# Patient Record
Sex: MALE | Race: WHITE | NOT HISPANIC OR LATINO | Employment: OTHER | URBAN - METROPOLITAN AREA
[De-identification: names, ages, dates, MRNs, and addresses within clinical notes are randomized per-mention and may not be internally consistent; named-entity substitution may affect disease eponyms.]

---

## 2023-11-07 DIAGNOSIS — N40.0 BENIGN PROSTATIC HYPERPLASIA, UNSPECIFIED WHETHER LOWER URINARY TRACT SYMPTOMS PRESENT: Primary | ICD-10-CM

## 2023-11-07 RX ORDER — TAMSULOSIN HYDROCHLORIDE 0.4 MG/1
0.8 CAPSULE ORAL DAILY
Qty: 180 CAPSULE | Refills: 0 | Status: SHIPPED | OUTPATIENT
Start: 2023-11-07 | End: 2024-01-04 | Stop reason: SDUPTHER

## 2023-12-15 PROBLEM — R97.20 ELEVATED PSA: Status: ACTIVE | Noted: 2018-08-06

## 2023-12-15 PROBLEM — E03.9 HYPOTHYROIDISM: Status: ACTIVE | Noted: 2018-08-06

## 2023-12-15 PROBLEM — E78.5 HYPERLIPIDEMIA: Status: ACTIVE | Noted: 2019-08-12

## 2024-01-04 ENCOUNTER — TELEPHONE (OUTPATIENT)
Dept: PRIMARY CARE | Facility: CLINIC | Age: 79
End: 2024-01-04

## 2024-01-04 ENCOUNTER — OFFICE VISIT (OUTPATIENT)
Dept: PRIMARY CARE | Facility: CLINIC | Age: 79
End: 2024-01-04
Payer: MEDICARE

## 2024-01-04 VITALS
OXYGEN SATURATION: 98 % | HEART RATE: 79 BPM | BODY MASS INDEX: 25.75 KG/M2 | DIASTOLIC BLOOD PRESSURE: 82 MMHG | SYSTOLIC BLOOD PRESSURE: 134 MMHG | WEIGHT: 164.4 LBS

## 2024-01-04 DIAGNOSIS — E03.9 HYPOTHYROIDISM, UNSPECIFIED TYPE: ICD-10-CM

## 2024-01-04 DIAGNOSIS — M17.0 PRIMARY OSTEOARTHRITIS OF BOTH KNEES: Primary | ICD-10-CM

## 2024-01-04 DIAGNOSIS — E78.5 HYPERLIPIDEMIA, UNSPECIFIED HYPERLIPIDEMIA TYPE: ICD-10-CM

## 2024-01-04 DIAGNOSIS — N40.0 BENIGN PROSTATIC HYPERPLASIA, UNSPECIFIED WHETHER LOWER URINARY TRACT SYMPTOMS PRESENT: ICD-10-CM

## 2024-01-04 PROCEDURE — 99214 OFFICE O/P EST MOD 30 MIN: CPT | Performed by: STUDENT IN AN ORGANIZED HEALTH CARE EDUCATION/TRAINING PROGRAM

## 2024-01-04 PROCEDURE — 1160F RVW MEDS BY RX/DR IN RCRD: CPT | Performed by: STUDENT IN AN ORGANIZED HEALTH CARE EDUCATION/TRAINING PROGRAM

## 2024-01-04 PROCEDURE — 1036F TOBACCO NON-USER: CPT | Performed by: STUDENT IN AN ORGANIZED HEALTH CARE EDUCATION/TRAINING PROGRAM

## 2024-01-04 PROCEDURE — 1159F MED LIST DOCD IN RCRD: CPT | Performed by: STUDENT IN AN ORGANIZED HEALTH CARE EDUCATION/TRAINING PROGRAM

## 2024-01-04 PROCEDURE — 1126F AMNT PAIN NOTED NONE PRSNT: CPT | Performed by: STUDENT IN AN ORGANIZED HEALTH CARE EDUCATION/TRAINING PROGRAM

## 2024-01-04 RX ORDER — TAMSULOSIN HYDROCHLORIDE 0.4 MG/1
0.8 CAPSULE ORAL DAILY
Qty: 180 CAPSULE | Refills: 1 | Status: SHIPPED | OUTPATIENT
Start: 2024-01-04

## 2024-01-04 RX ORDER — TAMSULOSIN HYDROCHLORIDE 0.4 MG/1
0.8 CAPSULE ORAL DAILY
Qty: 180 CAPSULE | Refills: 0 | Status: CANCELLED | OUTPATIENT
Start: 2024-01-04

## 2024-01-04 RX ORDER — ATORVASTATIN CALCIUM 20 MG/1
20 TABLET, FILM COATED ORAL DAILY
Qty: 90 TABLET | Refills: 1 | Status: CANCELLED | OUTPATIENT
Start: 2024-01-04

## 2024-01-04 RX ORDER — LEVOTHYROXINE SODIUM 50 UG/1
50 TABLET ORAL DAILY
Qty: 90 TABLET | Refills: 1 | Status: CANCELLED | OUTPATIENT
Start: 2024-01-04

## 2024-01-04 ASSESSMENT — PATIENT HEALTH QUESTIONNAIRE - PHQ9
SUM OF ALL RESPONSES TO PHQ9 QUESTIONS 1 AND 2: 0
1. LITTLE INTEREST OR PLEASURE IN DOING THINGS: NOT AT ALL
2. FEELING DOWN, DEPRESSED OR HOPELESS: NOT AT ALL

## 2024-01-04 ASSESSMENT — PAIN SCALES - GENERAL: PAINLEVEL: 0-NO PAIN

## 2024-01-04 NOTE — PROGRESS NOTES
GENERAL PROGRESS NOTE    Chief Complaint   Patient presents with    med check      Discuss covid vaccine        HPI  Víctor Madison is a 78 y.o. male that presents today to discuss COVID vaccine. He is planning to have done but wants to make sure still recommended.     #Knee OA  Has seen Dr. Brennan for knee OA, has had 2 steroid injections. Now following with Dr. Trotter. Had gel injection that was not too effective.   Steroid - Lt 6/20, 9/20. Rt 6/28, 9/20  Gel - 11/14 both knees  Plans to follow back soon to discuss replacement given the gel injections did not work well.     Due for CPE 4/2023    Vital signs   /82   Pulse 79   Wt 74.6 kg (164 lb 6.4 oz)   SpO2 98%   BMI 25.75 kg/m²      Current Outpatient Medications   Medication Sig Dispense Refill    atorvastatin (Lipitor) 20 mg tablet TAKE 1 TABLET DAILY 90 tablet 1    levothyroxine (Synthroid, Levoxyl) 50 mcg tablet TAKE 1 TABLET DAILY 90 tablet 1    tamsulosin (Flomax) 0.4 mg 24 hr capsule TAKE 2 CAPSULES DAILY 180 capsule 0    atorvastatin (Lipitor) 20 mg tablet Take 1 tablet (20 mg) by mouth once daily.      levothyroxine (Synthroid, Levoxyl) 50 mcg tablet Take 1 tablet (50 mcg) by mouth once daily in the morning. Take before meals.       No current facility-administered medications for this visit.         All other systems have been reviewed and are negative except as noted in the HPI.     Nursing notes and vitals reviewed  General appearance - AAOx3, non distressed  CVS - Warm and well perfused  RESP - No respiratory distress  PSYCH - Normal thought content, fluent and appropriate speech        Assessment/Plan   1. Primary osteoarthritis of both knees  2. Benign prostatic hyperplasia, unspecified whether lower urinary tract symptoms present  - tamsulosin (Flomax) 0.4 mg 24 hr capsule; Take 2 capsules (0.8 mg) by mouth once daily.   Dispense: 180 capsule; Refill: 1    COVID vaccines risks/benefits discussion. Recommended he receive. He will proceed with vaccine at pharmacy. All questions answered.   Will follow back with ortho to discuss knee replacement.     Monae Moura MD  01/04/24  8:08 AM

## 2024-04-01 ENCOUNTER — HOSPITAL ENCOUNTER (OUTPATIENT)
Dept: RADIOLOGY | Facility: HOSPITAL | Age: 79
Discharge: HOME | End: 2024-04-01
Payer: MEDICARE

## 2024-04-01 ENCOUNTER — APPOINTMENT (OUTPATIENT)
Dept: RADIOLOGY | Facility: CLINIC | Age: 79
End: 2024-04-01
Payer: MEDICARE

## 2024-04-01 ENCOUNTER — HOSPITAL ENCOUNTER (OUTPATIENT)
Dept: RADIOLOGY | Facility: CLINIC | Age: 79
End: 2024-04-01
Payer: MEDICARE

## 2024-04-01 DIAGNOSIS — M25.561 PAIN IN RIGHT KNEE: ICD-10-CM

## 2024-04-01 PROCEDURE — 73700 CT LOWER EXTREMITY W/O DYE: CPT | Mod: RT

## 2024-04-18 ENCOUNTER — PRE-ADMISSION TESTING (OUTPATIENT)
Dept: PREADMISSION TESTING | Facility: HOSPITAL | Age: 79
End: 2024-04-18
Payer: MEDICARE

## 2024-04-18 VITALS
DIASTOLIC BLOOD PRESSURE: 68 MMHG | OXYGEN SATURATION: 99 % | RESPIRATION RATE: 18 BRPM | BODY MASS INDEX: 25.95 KG/M2 | HEIGHT: 67 IN | TEMPERATURE: 97.2 F | WEIGHT: 165.34 LBS | SYSTOLIC BLOOD PRESSURE: 138 MMHG | HEART RATE: 54 BPM

## 2024-04-18 DIAGNOSIS — Z01.818 PREOP EXAMINATION: Primary | ICD-10-CM

## 2024-04-18 LAB
ANION GAP SERPL CALC-SCNC: 9 MMOL/L (ref 10–20)
APPEARANCE UR: CLEAR
BASOPHILS # BLD AUTO: 0.05 X10*3/UL (ref 0–0.1)
BASOPHILS NFR BLD AUTO: 0.9 %
BILIRUB UR STRIP.AUTO-MCNC: NEGATIVE MG/DL
BUN SERPL-MCNC: 17 MG/DL (ref 6–23)
CALCIUM SERPL-MCNC: 9.1 MG/DL (ref 8.6–10.3)
CHLORIDE SERPL-SCNC: 104 MMOL/L (ref 98–107)
CO2 SERPL-SCNC: 33 MMOL/L (ref 21–32)
COLOR UR: YELLOW
CREAT SERPL-MCNC: 1.35 MG/DL (ref 0.5–1.3)
EGFRCR SERPLBLD CKD-EPI 2021: 54 ML/MIN/1.73M*2
EOSINOPHIL # BLD AUTO: 0.12 X10*3/UL (ref 0–0.4)
EOSINOPHIL NFR BLD AUTO: 2.2 %
ERYTHROCYTE [DISTWIDTH] IN BLOOD BY AUTOMATED COUNT: 15.1 % (ref 11.5–14.5)
GLUCOSE SERPL-MCNC: 78 MG/DL (ref 74–99)
GLUCOSE UR STRIP.AUTO-MCNC: NEGATIVE MG/DL
HCT VFR BLD AUTO: 44.5 % (ref 41–52)
HGB BLD-MCNC: 13.8 G/DL (ref 13.5–17.5)
IMM GRANULOCYTES # BLD AUTO: 0.02 X10*3/UL (ref 0–0.5)
IMM GRANULOCYTES NFR BLD AUTO: 0.4 % (ref 0–0.9)
KETONES UR STRIP.AUTO-MCNC: NEGATIVE MG/DL
LEUKOCYTE ESTERASE UR QL STRIP.AUTO: NEGATIVE
LYMPHOCYTES # BLD AUTO: 1.33 X10*3/UL (ref 0.8–3)
LYMPHOCYTES NFR BLD AUTO: 24.9 %
MCH RBC QN AUTO: 27.7 PG (ref 26–34)
MCHC RBC AUTO-ENTMCNC: 31 G/DL (ref 32–36)
MCV RBC AUTO: 89 FL (ref 80–100)
MONOCYTES # BLD AUTO: 0.52 X10*3/UL (ref 0.05–0.8)
MONOCYTES NFR BLD AUTO: 9.7 %
NEUTROPHILS # BLD AUTO: 3.31 X10*3/UL (ref 1.6–5.5)
NEUTROPHILS NFR BLD AUTO: 61.9 %
NITRITE UR QL STRIP.AUTO: NEGATIVE
NRBC BLD-RTO: 0 /100 WBCS (ref 0–0)
PH UR STRIP.AUTO: 5 [PH]
PLATELET # BLD AUTO: 226 X10*3/UL (ref 150–450)
POTASSIUM SERPL-SCNC: 4.6 MMOL/L (ref 3.5–5.3)
PROT UR STRIP.AUTO-MCNC: NEGATIVE MG/DL
RBC # BLD AUTO: 4.98 X10*6/UL (ref 4.5–5.9)
RBC # UR STRIP.AUTO: NEGATIVE /UL
SODIUM SERPL-SCNC: 141 MMOL/L (ref 136–145)
SP GR UR STRIP.AUTO: 1.02
UROBILINOGEN UR STRIP.AUTO-MCNC: <2 MG/DL
WBC # BLD AUTO: 5.4 X10*3/UL (ref 4.4–11.3)

## 2024-04-18 PROCEDURE — 81003 URINALYSIS AUTO W/O SCOPE: CPT

## 2024-04-18 PROCEDURE — 36415 COLL VENOUS BLD VENIPUNCTURE: CPT

## 2024-04-18 PROCEDURE — 93010 ELECTROCARDIOGRAM REPORT: CPT | Performed by: INTERNAL MEDICINE

## 2024-04-18 PROCEDURE — 93005 ELECTROCARDIOGRAM TRACING: CPT

## 2024-04-18 PROCEDURE — 80048 BASIC METABOLIC PNL TOTAL CA: CPT

## 2024-04-18 PROCEDURE — 99204 OFFICE O/P NEW MOD 45 MIN: CPT

## 2024-04-18 PROCEDURE — 85025 COMPLETE CBC W/AUTO DIFF WBC: CPT

## 2024-04-18 PROCEDURE — 87081 CULTURE SCREEN ONLY: CPT | Mod: GEALAB

## 2024-04-18 RX ORDER — CHLORHEXIDINE GLUCONATE ORAL RINSE 1.2 MG/ML
15 SOLUTION DENTAL DAILY
Qty: 473 ML | Refills: 0 | Status: SHIPPED | OUTPATIENT
Start: 2024-04-18 | End: 2024-05-06 | Stop reason: HOSPADM

## 2024-04-18 ASSESSMENT — ENCOUNTER SYMPTOMS
CHILLS: 0
DIARRHEA: 0
VOMITING: 0
SHORTNESS OF BREATH: 0
FEVER: 0
NUMBNESS: 0
NAUSEA: 0
ABDOMINAL PAIN: 0
WOUND: 0
DIFFICULTY URINATING: 0

## 2024-04-18 ASSESSMENT — PAIN SCALES - GENERAL: PAINLEVEL_OUTOF10: 0 - NO PAIN

## 2024-04-18 ASSESSMENT — DUKE ACTIVITY SCORE INDEX (DASI)
CAN YOU DO LIGHT WORK AROUND THE HOUSE LIKE DUSTING OR WASHING DISHES: YES
CAN YOU PARTICIPATE IN STRENOUS SPORTS LIKE SWIMMING, SINGLES TENNIS, FOOTBALL, BASKETBALL, OR SKIING: YES
CAN YOU DO HEAVY WORK AROUND THE HOUSE LIKE SCRUBBING FLOORS OR LIFTING AND MOVING HEAVY FURNITURE: YES
CAN YOU WALK INDOORS, SUCH AS AROUND YOUR HOUSE: YES
TOTAL_SCORE: 58.2
CAN YOU DO MODERATE WORK AROUND THE HOUSE LIKE VACUUMING, SWEEPING FLOORS OR CARRYING GROCERIES: YES
CAN YOU RUN A SHORT DISTANCE: YES
CAN YOU CLIMB A FLIGHT OF STAIRS OR WALK UP A HILL: YES
CAN YOU CLIMB A FLIGHT OF STAIRS OR WALK UP A HILL: YES
CAN YOU WALK A BLOCK OR TWO ON LEVEL GROUND: YES
CAN YOU RUN A SHORT DISTANCE: YES
CAN YOU PARTICIPATE IN MODERATE RECREATIONAL ACTIVITIES LIKE GOLF, BOWLING, DANCING, DOUBLES TENNIS OR THROWING A BASEBALL OR FOOTBALL: YES
CAN YOU TAKE CARE OF YOURSELF (EAT, DRESS, BATHE, OR USE TOILET): YES
CAN YOU DO MODERATE WORK AROUND THE HOUSE LIKE VACUUMING, SWEEPING FLOORS OR CARRYING GROCERIES: YES
CAN YOU DO YARD WORK LIKE RAKING LEAVES, WEEDING OR PUSHING A MOWER: YES
CAN YOU DO YARD WORK LIKE RAKING LEAVES, WEEDING OR PUSHING A MOWER: YES
CAN YOU WALK A BLOCK OR TWO ON LEVEL GROUND: YES
CAN YOU DO LIGHT WORK AROUND THE HOUSE LIKE DUSTING OR WASHING DISHES: YES
CAN YOU HAVE SEXUAL RELATIONS: YES
CAN YOU DO HEAVY WORK AROUND THE HOUSE LIKE SCRUBBING FLOORS OR LIFTING AND MOVING HEAVY FURNITURE: YES
CAN YOU PARTICIPATE IN STRENOUS SPORTS LIKE SWIMMING, SINGLES TENNIS, FOOTBALL, BASKETBALL, OR SKIING: NO
DASI METS SCORE: 9.9
CAN YOU WALK INDOORS, SUCH AS AROUND YOUR HOUSE: YES
CAN YOU PARTICIPATE IN MODERATE RECREATIONAL ACTIVITIES LIKE GOLF, BOWLING, DANCING, DOUBLES TENNIS OR THROWING A BASEBALL OR FOOTBALL: YES
CAN YOU TAKE CARE OF YOURSELF (EAT, DRESS, BATHE, OR USE TOILET): YES

## 2024-04-18 ASSESSMENT — PAIN - FUNCTIONAL ASSESSMENT: PAIN_FUNCTIONAL_ASSESSMENT: 0-10

## 2024-04-18 ASSESSMENT — CHADS2 SCORE
CHF: NO
AGE GREATER THAN OR EQUAL TO 75: YES
DIABETES: NO
HYPERTENSION: NO
CHADS2 SCORE: 1
PRIOR STROKE OR TIA OR THROMBOEMBOLISM: NO

## 2024-04-18 ASSESSMENT — LIFESTYLE VARIABLES: SMOKING_STATUS: NONSMOKER

## 2024-04-18 ASSESSMENT — ACTIVITIES OF DAILY LIVING (ADL): ADL_SCORE: 0

## 2024-04-18 NOTE — H&P (VIEW-ONLY)
CPM/PAT Evaluation       Name: Víctor Madison (Víctor Madison)  /Age: 1945/78 y.o.     Visit Type:   In-Person       Chief Complaint: Preoperative exam    Preoperative exam for right TKA on 24 with Dr. Trotter        Past Medical History:   Diagnosis Date    BPH (benign prostatic hyperplasia)     Elevated PSA     Hyperlipidemia     Hypothyroidism        Past Surgical History:   Procedure Laterality Date    UMBILICAL HERNIA REPAIR         Patient  has no history on file for sexual activity.    Family History   Problem Relation Name Age of Onset    Cervical cancer Mother      Lung cancer Mother      Cerebral aneurysm Maternal Grandfather  60       No Known Allergies    Prior to Admission medications    Medication Sig Start Date End Date Taking? Authorizing Provider   atorvastatin (Lipitor) 20 mg tablet TAKE 1 TABLET DAILY 23  Yes Monae Moura MD   levothyroxine (Synthroid, Levoxyl) 50 mcg tablet TAKE 1 TABLET DAILY 23  Yes Monae Moura MD   tamsulosin (Flomax) 0.4 mg 24 hr capsule Take 2 capsules (0.8 mg) by mouth once daily.  Patient taking differently: Take 1 capsule (0.4 mg) by mouth 2 times a day. 24  Yes Monae Moura MD   chlorhexidine (Peridex) 0.12 % solution Use 15 mL in the mouth or throat once daily. Swish and spit one capful the night before surgery and morning  of surgery 24  YADIRA Hare-CNP        PAT ROS:   Constitutional:    no fever   no chills  Neuro/Psych:    no numbness  Eyes:    use of corrective lenses  Ears:   Nose:   Mouth:    no dental issues  Throat:   Neck:   Cardio:    no chest pain  Respiratory:    no shortness of breath  Endocrine:   GI:    no abdominal pain   no diarrhea   no nausea   no vomiting  :    no difficulty urinating  Musculoskeletal:   Hematologic:    no history of blood transfusion   no blood clots  Skin:   no sores/wound   no rash      Physical Exam  Vitals reviewed.   Constitutional:       Appearance: Normal appearance.    HENT:      Mouth/Throat:      Mouth: Mucous membranes are moist.   Eyes:      Extraocular Movements: Extraocular movements intact.   Cardiovascular:      Rate and Rhythm: Normal rate and regular rhythm.      Heart sounds: Normal heart sounds.   Pulmonary:      Effort: Pulmonary effort is normal.      Breath sounds: Normal breath sounds.   Abdominal:      Palpations: Abdomen is soft.   Musculoskeletal:      Cervical back: Normal range of motion.      Right lower leg: No edema.      Left lower leg: No edema.   Skin:     General: Skin is warm and dry.   Neurological:      General: No focal deficit present.      Mental Status: He is alert and oriented to person, place, and time.   Psychiatric:         Behavior: Behavior normal.         Thought Content: Thought content normal.          PAT AIRWAY:   Airway:     Mallampati::  II    Neck ROM::  Full  normal        Visit Vitals  /68   Pulse 54   Temp 36.2 °C (97.2 °F) (Temporal)   Resp 18       DASI Risk Score      Flowsheet Row Most Recent Value   DASI SCORE 58.2   METS Score (Will be calculated only when all the questions are answered) 9.9          Caprini DVT Assessment      Flowsheet Row Most Recent Value   DVT Score 8   Current Status Major surgery planned, lasting 2-3 hours   History Prior major surgery   Age Over 75 years   BMI 30 or less          Modified Frailty Index      Flowsheet Row Most Recent Value   Modified Frailty Index Calculator 0          CHADS2 Stroke Risk         N/A 3 to 100%: High Risk   2 to < 3%: Medium Risk   0 to < 2%: Low Risk     Last Change: N/A          This score determines the patient's risk of having a stroke if the patient has atrial fibrillation.        This score is not applicable to this patient. Components are not calculated.          Revised Cardiac Risk Index      Flowsheet Row Most Recent Value   Revised Cardiac Risk Calculator 0          Apfel Simplified Score      Flowsheet Row Most Recent Value   Apfel Simplified Score  Calculator 2          Risk Analysis Index Results This Encounter         4/18/2024  1228             ROMANO Cancer History: Patient does not indicate history of cancer    Total Risk Analysis Index Score Without Cancer: 27    Total Risk Analysis Index Score: 27          Stop Bang Score      Flowsheet Row Most Recent Value   Do you snore loudly? 0   Do you often feel tired or fatigued after your sleep? 0   Has anyone ever observed you stop breathing in your sleep? 0   Do you have or are you being treated for high blood pressure? 0   Recent BMI (Calculated) 26   Is BMI greater than 35 kg/m2? 0=No   Age older than 50 years old? 1=Yes   Is your neck circumference greater than 17 inches (Male) or 16 inches (Female)? 0   Gender - Male 1=Yes   STOP-BANG Total Score 2            Assessment and Plan:     Neuro:  normal neuro    HEENT/Airway:  normal HEENT, normal airway    Cardiovascular: history of HLD, continue medication. EKG 4/18/24- SB, rate 50 bpm. No additional preoperative testing is currently indicated.    Pulmonary:  normal. Preoperative deep breathing educational handout provided to patient.    Renal: negative     Endocrine:  hypothyroidism, continue medication.     Hematologic:   negative.     Gastrointestinal:   negative    Infectious disease:  negative      Musculoskeletal:  history of OA, continue acetaminophen PRN      Labs ordered by this provider: CBC, BMP, MRSA, urinalysis, EKG

## 2024-04-19 LAB — HOLD SPECIMEN: NORMAL

## 2024-04-20 LAB — STAPHYLOCOCCUS SPEC CULT: NORMAL

## 2024-04-25 ENCOUNTER — EDUCATION (OUTPATIENT)
Dept: ORTHOPEDIC SURGERY | Facility: CLINIC | Age: 79
End: 2024-04-25
Payer: MEDICARE

## 2024-04-25 NOTE — GROUP NOTE
In addition to the group class activities, Víctor TING Madison had the following lab work completed:  No orders of the defined types were placed in this encounter.      A new History and Physical was not completed.    This class lasted approximately 1 hour and had 9 participants. The nurse instructor covered the following topics:  Overview of joint replacement surgery.  Instructions for at-home preparation for surgery (included below).  Expectations before and after surgery including hospital stay.  Discharge planning and home care options.  Physical therapy overview and review of at-home exercises.    Information for Surgery or Hospital Stay  For morning surgery, do not eat or drink after midnight. This includes water, mints, and chewing gum.  For afternoon surgery, you may have a clear liquid breakfast before 6 A.M. Clear liquids are anything you can see through, like apple juice, cranberry juice, tea or black coffee without cream. Do not eat or drink after 6 A.M. This includes water.  Wear loose fitting clothes--something that can be slipped on and off easily over a dressing.  Bring a walker or crutches with you to the hospital on the day of surgery.  Please inform the office if you have any symptoms of illness or fever prior to surgery.  You need to have transportation home when discharged, as you will be medicated.  STOP any aspirin or anti-inflammatory products (Aleve, Advil, etc.) 5-7 days before surgery.  You may use Tylenol or Extra Strength Tylenol.  STOP any vitamins or herbal products 10 days before surgery.

## 2024-05-01 ENCOUNTER — ANESTHESIA EVENT (OUTPATIENT)
Dept: OPERATING ROOM | Facility: HOSPITAL | Age: 79
End: 2024-05-01
Payer: MEDICARE

## 2024-05-03 ENCOUNTER — TELEPHONE (OUTPATIENT)
Dept: INPATIENT UNIT | Facility: HOSPITAL | Age: 79
End: 2024-05-03
Payer: MEDICARE

## 2024-05-06 ENCOUNTER — DOCUMENTATION (OUTPATIENT)
Dept: HOME HEALTH SERVICES | Facility: HOME HEALTH | Age: 79
End: 2024-05-06
Payer: MEDICARE

## 2024-05-06 ENCOUNTER — APPOINTMENT (OUTPATIENT)
Dept: RADIOLOGY | Facility: HOSPITAL | Age: 79
End: 2024-05-06
Payer: MEDICARE

## 2024-05-06 ENCOUNTER — HOSPITAL ENCOUNTER (OUTPATIENT)
Facility: HOSPITAL | Age: 79
Setting detail: OUTPATIENT SURGERY
Discharge: HOME HEALTH | End: 2024-05-06
Attending: ORTHOPAEDIC SURGERY | Admitting: ORTHOPAEDIC SURGERY
Payer: MEDICARE

## 2024-05-06 ENCOUNTER — ANESTHESIA (OUTPATIENT)
Dept: OPERATING ROOM | Facility: HOSPITAL | Age: 79
End: 2024-05-06
Payer: MEDICARE

## 2024-05-06 ENCOUNTER — HOME HEALTH ADMISSION (OUTPATIENT)
Dept: HOME HEALTH SERVICES | Facility: HOME HEALTH | Age: 79
End: 2024-05-06
Payer: MEDICARE

## 2024-05-06 VITALS
WEIGHT: 158.73 LBS | RESPIRATION RATE: 16 BRPM | BODY MASS INDEX: 24.91 KG/M2 | DIASTOLIC BLOOD PRESSURE: 70 MMHG | HEIGHT: 67 IN | HEART RATE: 54 BPM | OXYGEN SATURATION: 99 % | TEMPERATURE: 97.9 F | SYSTOLIC BLOOD PRESSURE: 129 MMHG

## 2024-05-06 DIAGNOSIS — Z96.651 STATUS POST TOTAL KNEE REPLACEMENT, RIGHT: Primary | ICD-10-CM

## 2024-05-06 PROCEDURE — 2500000004 HC RX 250 GENERAL PHARMACY W/ HCPCS (ALT 636 FOR OP/ED): Performed by: ANESTHESIOLOGY

## 2024-05-06 PROCEDURE — C1776 JOINT DEVICE (IMPLANTABLE): HCPCS | Performed by: ORTHOPAEDIC SURGERY

## 2024-05-06 PROCEDURE — 97116 GAIT TRAINING THERAPY: CPT | Mod: GP | Performed by: PHYSICAL THERAPIST

## 2024-05-06 PROCEDURE — 2500000004 HC RX 250 GENERAL PHARMACY W/ HCPCS (ALT 636 FOR OP/ED): Performed by: ORTHOPAEDIC SURGERY

## 2024-05-06 PROCEDURE — A6213 FOAM DRG >16<=48 SQ IN W/BDR: HCPCS | Performed by: ORTHOPAEDIC SURGERY

## 2024-05-06 PROCEDURE — 3600000018 HC OR TIME - INITIAL BASE CHARGE - PROCEDURE LEVEL SIX: Performed by: ORTHOPAEDIC SURGERY

## 2024-05-06 PROCEDURE — 2780000003 HC OR 278 NO HCPCS: Performed by: ORTHOPAEDIC SURGERY

## 2024-05-06 PROCEDURE — 99100 ANES PT EXTEME AGE<1 YR&>70: CPT | Performed by: ANESTHESIOLOGY

## 2024-05-06 PROCEDURE — 3600000017 HC OR TIME - EACH INCREMENTAL 1 MINUTE - PROCEDURE LEVEL SIX: Performed by: ORTHOPAEDIC SURGERY

## 2024-05-06 PROCEDURE — 7100000010 HC PHASE TWO TIME - EACH INCREMENTAL 1 MINUTE: Performed by: ORTHOPAEDIC SURGERY

## 2024-05-06 PROCEDURE — A9999 DME SUPPLY OR ACCESSORY, NOS: HCPCS | Performed by: ORTHOPAEDIC SURGERY

## 2024-05-06 PROCEDURE — 3700000001 HC GENERAL ANESTHESIA TIME - INITIAL BASE CHARGE: Performed by: ORTHOPAEDIC SURGERY

## 2024-05-06 PROCEDURE — 3700000002 HC GENERAL ANESTHESIA TIME - EACH INCREMENTAL 1 MINUTE: Performed by: ORTHOPAEDIC SURGERY

## 2024-05-06 PROCEDURE — 97161 PT EVAL LOW COMPLEX 20 MIN: CPT | Mod: GP | Performed by: PHYSICAL THERAPIST

## 2024-05-06 PROCEDURE — 73560 X-RAY EXAM OF KNEE 1 OR 2: CPT | Mod: RIGHT SIDE | Performed by: RADIOLOGY

## 2024-05-06 PROCEDURE — 2500000004 HC RX 250 GENERAL PHARMACY W/ HCPCS (ALT 636 FOR OP/ED): Performed by: NURSE ANESTHETIST, CERTIFIED REGISTERED

## 2024-05-06 PROCEDURE — 7100000002 HC RECOVERY ROOM TIME - EACH INCREMENTAL 1 MINUTE: Performed by: ORTHOPAEDIC SURGERY

## 2024-05-06 PROCEDURE — 7100000001 HC RECOVERY ROOM TIME - INITIAL BASE CHARGE: Performed by: ORTHOPAEDIC SURGERY

## 2024-05-06 PROCEDURE — A27447 PR TOTAL KNEE ARTHROPLASTY: Performed by: ANESTHESIOLOGY

## 2024-05-06 PROCEDURE — A27447 PR TOTAL KNEE ARTHROPLASTY: Performed by: NURSE ANESTHETIST, CERTIFIED REGISTERED

## 2024-05-06 PROCEDURE — 2500000004 HC RX 250 GENERAL PHARMACY W/ HCPCS (ALT 636 FOR OP/ED): Mod: JZ

## 2024-05-06 PROCEDURE — C1713 ANCHOR/SCREW BN/BN,TIS/BN: HCPCS | Performed by: ORTHOPAEDIC SURGERY

## 2024-05-06 PROCEDURE — 2720000007 HC OR 272 NO HCPCS: Performed by: ORTHOPAEDIC SURGERY

## 2024-05-06 PROCEDURE — 73560 X-RAY EXAM OF KNEE 1 OR 2: CPT | Mod: RT

## 2024-05-06 PROCEDURE — 2500000005 HC RX 250 GENERAL PHARMACY W/O HCPCS: Performed by: NURSE ANESTHETIST, CERTIFIED REGISTERED

## 2024-05-06 PROCEDURE — 97110 THERAPEUTIC EXERCISES: CPT | Mod: GP | Performed by: PHYSICAL THERAPIST

## 2024-05-06 PROCEDURE — 7100000009 HC PHASE TWO TIME - INITIAL BASE CHARGE: Performed by: ORTHOPAEDIC SURGERY

## 2024-05-06 DEVICE — CRUCIATE RETAINING FEMORAL
Type: IMPLANTABLE DEVICE | Site: KNEE | Status: FUNCTIONAL
Brand: TRIATHLON

## 2024-05-06 DEVICE — PATELLA
Type: IMPLANTABLE DEVICE | Site: PATELLA | Status: FUNCTIONAL
Brand: TRIATHLON

## 2024-05-06 DEVICE — TIBIAL COMPONENT
Type: IMPLANTABLE DEVICE | Site: KNEE | Status: FUNCTIONAL
Brand: TRIATHLON

## 2024-05-06 DEVICE — TIBIAL BEARING INSERT - CS
Type: IMPLANTABLE DEVICE | Site: KNEE | Status: FUNCTIONAL
Brand: TRIATHLON

## 2024-05-06 RX ORDER — ATORVASTATIN CALCIUM 20 MG/1
20 TABLET, FILM COATED ORAL DAILY
Status: CANCELLED | OUTPATIENT
Start: 2024-05-06

## 2024-05-06 RX ORDER — OXYCODONE AND ACETAMINOPHEN 5; 325 MG/1; MG/1
1 TABLET ORAL EVERY 6 HOURS PRN
COMMUNITY
Start: 2024-05-06

## 2024-05-06 RX ORDER — FENTANYL CITRATE 50 UG/ML
INJECTION, SOLUTION INTRAMUSCULAR; INTRAVENOUS AS NEEDED
Status: DISCONTINUED | OUTPATIENT
Start: 2024-05-06 | End: 2024-05-06

## 2024-05-06 RX ORDER — TRAMADOL HYDROCHLORIDE 50 MG/1
50 TABLET ORAL EVERY 8 HOURS PRN
Status: CANCELLED | OUTPATIENT
Start: 2024-05-06

## 2024-05-06 RX ORDER — ASPIRIN 325 MG
325 TABLET, DELAYED RELEASE (ENTERIC COATED) ORAL 2 TIMES DAILY
Status: CANCELLED | OUTPATIENT
Start: 2024-05-07

## 2024-05-06 RX ORDER — SODIUM CHLORIDE, SODIUM LACTATE, POTASSIUM CHLORIDE, CALCIUM CHLORIDE 600; 310; 30; 20 MG/100ML; MG/100ML; MG/100ML; MG/100ML
100 INJECTION, SOLUTION INTRAVENOUS CONTINUOUS
Status: DISCONTINUED | OUTPATIENT
Start: 2024-05-06 | End: 2024-05-06 | Stop reason: HOSPADM

## 2024-05-06 RX ORDER — OXYCODONE HYDROCHLORIDE 5 MG/1
5 TABLET ORAL EVERY 6 HOURS PRN
Status: CANCELLED | OUTPATIENT
Start: 2024-05-06

## 2024-05-06 RX ORDER — ONDANSETRON HYDROCHLORIDE 2 MG/ML
INJECTION, SOLUTION INTRAVENOUS AS NEEDED
Status: DISCONTINUED | OUTPATIENT
Start: 2024-05-06 | End: 2024-05-06

## 2024-05-06 RX ORDER — CARISOPRODOL 350 MG/1
1 TABLET ORAL 3 TIMES DAILY PRN
COMMUNITY
Start: 2024-05-06

## 2024-05-06 RX ORDER — TRANEXAMIC ACID 10 MG/ML
INJECTION, SOLUTION INTRAVENOUS AS NEEDED
Status: DISCONTINUED | OUTPATIENT
Start: 2024-05-06 | End: 2024-05-06

## 2024-05-06 RX ORDER — ONDANSETRON HYDROCHLORIDE 2 MG/ML
4 INJECTION, SOLUTION INTRAVENOUS EVERY 8 HOURS PRN
Status: CANCELLED | OUTPATIENT
Start: 2024-05-06

## 2024-05-06 RX ORDER — NALOXONE HYDROCHLORIDE 0.4 MG/ML
0.2 INJECTION, SOLUTION INTRAMUSCULAR; INTRAVENOUS; SUBCUTANEOUS EVERY 5 MIN PRN
Status: CANCELLED | OUTPATIENT
Start: 2024-05-06

## 2024-05-06 RX ORDER — OXYCODONE HYDROCHLORIDE 5 MG/1
5 TABLET ORAL EVERY 4 HOURS PRN
Status: DISCONTINUED | OUTPATIENT
Start: 2024-05-06 | End: 2024-05-06 | Stop reason: HOSPADM

## 2024-05-06 RX ORDER — CEFAZOLIN SODIUM 2 G/100ML
2 INJECTION, SOLUTION INTRAVENOUS EVERY 8 HOURS
Status: CANCELLED | OUTPATIENT
Start: 2024-05-06 | End: 2024-05-06

## 2024-05-06 RX ORDER — MIDAZOLAM HYDROCHLORIDE 1 MG/ML
INJECTION, SOLUTION INTRAMUSCULAR; INTRAVENOUS CONTINUOUS PRN
Status: DISCONTINUED | OUTPATIENT
Start: 2024-05-06 | End: 2024-05-06

## 2024-05-06 RX ORDER — ONDANSETRON HYDROCHLORIDE 2 MG/ML
4 INJECTION, SOLUTION INTRAVENOUS ONCE AS NEEDED
Status: DISCONTINUED | OUTPATIENT
Start: 2024-05-06 | End: 2024-05-06 | Stop reason: HOSPADM

## 2024-05-06 RX ORDER — DOCUSATE SODIUM 100 MG/1
1 CAPSULE, LIQUID FILLED ORAL 2 TIMES DAILY PRN
COMMUNITY
Start: 2024-05-06

## 2024-05-06 RX ORDER — KETOROLAC TROMETHAMINE 15 MG/ML
15 INJECTION, SOLUTION INTRAMUSCULAR; INTRAVENOUS EVERY 6 HOURS
Status: CANCELLED | OUTPATIENT
Start: 2024-05-06 | End: 2024-05-07

## 2024-05-06 RX ORDER — LEVOTHYROXINE SODIUM 50 UG/1
50 TABLET ORAL DAILY
Status: CANCELLED | OUTPATIENT
Start: 2024-05-06

## 2024-05-06 RX ORDER — ASPIRIN 325 MG
1 TABLET, DELAYED RELEASE (ENTERIC COATED) ORAL 2 TIMES DAILY
COMMUNITY
Start: 2024-05-06

## 2024-05-06 RX ORDER — GABAPENTIN 300 MG/1
1 CAPSULE ORAL 3 TIMES DAILY PRN
COMMUNITY
Start: 2024-05-06

## 2024-05-06 RX ORDER — ACETAMINOPHEN 325 MG/1
975 TABLET ORAL EVERY 6 HOURS SCHEDULED
Status: CANCELLED | OUTPATIENT
Start: 2024-05-06

## 2024-05-06 RX ORDER — GABAPENTIN 300 MG/1
300 CAPSULE ORAL 3 TIMES DAILY PRN
Status: CANCELLED | OUTPATIENT
Start: 2024-05-06

## 2024-05-06 RX ORDER — SODIUM CHLORIDE, SODIUM LACTATE, POTASSIUM CHLORIDE, CALCIUM CHLORIDE 600; 310; 30; 20 MG/100ML; MG/100ML; MG/100ML; MG/100ML
100 INJECTION, SOLUTION INTRAVENOUS CONTINUOUS
Status: CANCELLED | OUTPATIENT
Start: 2024-05-06 | End: 2024-05-07

## 2024-05-06 RX ORDER — DROPERIDOL 2.5 MG/ML
0.62 INJECTION, SOLUTION INTRAMUSCULAR; INTRAVENOUS ONCE AS NEEDED
Status: DISCONTINUED | OUTPATIENT
Start: 2024-05-06 | End: 2024-05-06 | Stop reason: HOSPADM

## 2024-05-06 RX ORDER — MORPHINE SULFATE 2 MG/ML
2 INJECTION, SOLUTION INTRAMUSCULAR; INTRAVENOUS
Status: CANCELLED | OUTPATIENT
Start: 2024-05-06

## 2024-05-06 RX ORDER — TAMSULOSIN HYDROCHLORIDE 0.4 MG/1
0.4 CAPSULE ORAL 2 TIMES DAILY
Status: CANCELLED | OUTPATIENT
Start: 2024-05-06

## 2024-05-06 RX ORDER — PROPOFOL 10 MG/ML
INJECTION, EMULSION INTRAVENOUS CONTINUOUS PRN
Status: DISCONTINUED | OUTPATIENT
Start: 2024-05-06 | End: 2024-05-06

## 2024-05-06 RX ORDER — CEFAZOLIN SODIUM 2 G/100ML
2 INJECTION, SOLUTION INTRAVENOUS ONCE
Status: COMPLETED | OUTPATIENT
Start: 2024-05-06 | End: 2024-05-06

## 2024-05-06 RX ORDER — DIPHENHYDRAMINE HYDROCHLORIDE 50 MG/ML
12.5 INJECTION INTRAMUSCULAR; INTRAVENOUS EVERY 6 HOURS PRN
Status: CANCELLED | OUTPATIENT
Start: 2024-05-06

## 2024-05-06 RX ORDER — CARISOPRODOL 350 MG/1
350 TABLET ORAL 3 TIMES DAILY PRN
Status: CANCELLED | OUTPATIENT
Start: 2024-05-06

## 2024-05-06 RX ORDER — LIDOCAINE HCL/PF 100 MG/5ML
SYRINGE (ML) INTRAVENOUS AS NEEDED
Status: DISCONTINUED | OUTPATIENT
Start: 2024-05-06 | End: 2024-05-06

## 2024-05-06 RX ORDER — OXYCODONE HYDROCHLORIDE 5 MG/1
10 TABLET ORAL EVERY 6 HOURS PRN
Status: CANCELLED | OUTPATIENT
Start: 2024-05-06

## 2024-05-06 RX ORDER — CELECOXIB 200 MG/1
1 CAPSULE ORAL 2 TIMES DAILY PRN
COMMUNITY
Start: 2024-05-06

## 2024-05-06 RX ORDER — MIDAZOLAM HYDROCHLORIDE 1 MG/ML
INJECTION INTRAMUSCULAR; INTRAVENOUS AS NEEDED
Status: DISCONTINUED | OUTPATIENT
Start: 2024-05-06 | End: 2024-05-06

## 2024-05-06 RX ORDER — ONDANSETRON 4 MG/1
4 TABLET, ORALLY DISINTEGRATING ORAL EVERY 8 HOURS PRN
Status: CANCELLED | OUTPATIENT
Start: 2024-05-06

## 2024-05-06 RX ORDER — ASCORBIC ACID 500 MG
1 TABLET ORAL 2 TIMES DAILY
COMMUNITY
Start: 2024-05-06

## 2024-05-06 RX ORDER — BUPIVACAINE HYDROCHLORIDE 7.5 MG/ML
INJECTION, SOLUTION INTRASPINAL AS NEEDED
Status: DISCONTINUED | OUTPATIENT
Start: 2024-05-06 | End: 2024-05-06

## 2024-05-06 RX ORDER — DOCUSATE SODIUM 100 MG/1
100 CAPSULE, LIQUID FILLED ORAL 2 TIMES DAILY
Status: CANCELLED | OUTPATIENT
Start: 2024-05-06

## 2024-05-06 RX ADMIN — CEFAZOLIN SODIUM 2 G: 2 INJECTION, SOLUTION INTRAVENOUS at 09:34

## 2024-05-06 RX ADMIN — ONDANSETRON 4 MG: 2 INJECTION, SOLUTION INTRAMUSCULAR; INTRAVENOUS at 11:56

## 2024-05-06 RX ADMIN — TRANEXAMIC ACID 1000 MG: 10 INJECTION, SOLUTION INTRAVENOUS at 11:46

## 2024-05-06 RX ADMIN — EPHEDRINE SULFATE 10 MG: 50 INJECTION, SOLUTION INTRAVENOUS at 11:46

## 2024-05-06 RX ADMIN — EPHEDRINE SULFATE 5 MG: 50 INJECTION, SOLUTION INTRAVENOUS at 10:47

## 2024-05-06 RX ADMIN — SODIUM CHLORIDE, POTASSIUM CHLORIDE, SODIUM LACTATE AND CALCIUM CHLORIDE: 600; 310; 30; 20 INJECTION, SOLUTION INTRAVENOUS at 12:13

## 2024-05-06 RX ADMIN — BUPIVACAINE HYDROCHLORIDE IN DEXTROSE 1.7 ML: 7.5 INJECTION, SOLUTION SUBARACHNOID at 09:52

## 2024-05-06 RX ADMIN — MIDAZOLAM HYDROCHLORIDE 1 MG: 1 INJECTION, SOLUTION INTRAMUSCULAR; INTRAVENOUS at 09:49

## 2024-05-06 RX ADMIN — PROPOFOL 120 MCG/KG/MIN: 10 INJECTION, EMULSION INTRAVENOUS at 09:55

## 2024-05-06 RX ADMIN — TRANEXAMIC ACID 1000 MG: 10 INJECTION, SOLUTION INTRAVENOUS at 09:56

## 2024-05-06 RX ADMIN — EPHEDRINE SULFATE 5 MG: 50 INJECTION, SOLUTION INTRAVENOUS at 11:02

## 2024-05-06 RX ADMIN — LIDOCAINE HYDROCHLORIDE 80 MG: 20 INJECTION INTRAVENOUS at 09:54

## 2024-05-06 RX ADMIN — SODIUM CHLORIDE, POTASSIUM CHLORIDE, SODIUM LACTATE AND CALCIUM CHLORIDE 100 ML/HR: 600; 310; 30; 20 INJECTION, SOLUTION INTRAVENOUS at 08:14

## 2024-05-06 RX ADMIN — DEXAMETHASONE SODIUM PHOSPHATE 8 MG: 4 INJECTION INTRA-ARTICULAR; INTRALESIONAL; INTRAMUSCULAR; INTRAVENOUS; SOFT TISSUE at 09:57

## 2024-05-06 RX ADMIN — FENTANYL CITRATE 25 MCG: 50 INJECTION, SOLUTION INTRAMUSCULAR; INTRAVENOUS at 09:49

## 2024-05-06 RX ADMIN — EPHEDRINE SULFATE 10 MG: 50 INJECTION, SOLUTION INTRAVENOUS at 11:07

## 2024-05-06 SDOH — HEALTH STABILITY: MENTAL HEALTH: CURRENT SMOKER: 0

## 2024-05-06 ASSESSMENT — PAIN SCALES - GENERAL
PAINLEVEL_OUTOF10: 0 - NO PAIN
PAIN_LEVEL: 2

## 2024-05-06 ASSESSMENT — PAIN - FUNCTIONAL ASSESSMENT
PAIN_FUNCTIONAL_ASSESSMENT: 0-10

## 2024-05-06 ASSESSMENT — COGNITIVE AND FUNCTIONAL STATUS - GENERAL
MOBILITY SCORE: 20
STANDING UP FROM CHAIR USING ARMS: A LITTLE
WALKING IN HOSPITAL ROOM: A LITTLE
MOVING TO AND FROM BED TO CHAIR: A LITTLE
CLIMB 3 TO 5 STEPS WITH RAILING: A LITTLE

## 2024-05-06 ASSESSMENT — ACTIVITIES OF DAILY LIVING (ADL): ADL_ASSISTANCE: INDEPENDENT

## 2024-05-06 NOTE — OP NOTE
[facility] ROBOT OPEN TOTAL KNEE ARTHROPLASTY (R) Operative Note     Date: 2024  OR Location: [facility] OR    Name: Víctor Madison, : 1945, Age: 78 y.o., MRN: [id number], Sex: male    Diagnosis  Pre-op Diagnosis     * Primary osteoarthritis of right knee [M17.11] Post-op Diagnosis     * Primary osteoarthritis of right knee [M17.11]     Procedures  [facility] ROBOT OPEN TOTAL KNEE ARTHROPLASTY  22768 - [place] ARTHRP KNE CONDYLE&PLATU MEDIAL&LAT COMPARTMENTS      Surgeons      * Brennan Trotter - Primary    Resident/Fellow/Other Assistant:  Saurav Babcock PA-C    Procedure Summary  Anesthesia: Regional nerve blocks, spinal anesthetic, MAC sedation ASA: III  Anesthesia Staff: Anesthesiologist: Clint Kapadia MD  CRNA: YADIRA Ibarra-CRNA  Estimated Blood Loss: 5 mL  Intra-op Medications:   Administrations occurring from 0930 to 1200 on 24:   Medication Name Total Dose   EPINEPHrine (Adrenalin) 0.2 mL, ketorolac (Toradol) 30 mg, morphine PF (Duramorph) 5 mg, ropivacaine (Naropin) 5 mg/ml (0.5 %) 30 mL in sodium chloride 0.9% 20 mL syringe 56.2 mL   ceFAZolin in dextrose (iso-os) (Ancef) IVPB 2 g 2 g              Anesthesia Record               Intraprocedure I/O Totals          Intake    Tranexamic Acid 0.00 mL    The total shown is the total volume documented since Anesthesia Start was filed.    Propofol Drip 0.00 mL    The total shown is the total volume documented since Anesthesia Start was filed.    Total Intake 0 mL          Specimen: No specimens collected     Staff:   Circulator: Marguerite Lynn RN  Relief Circulator: Clint Kumari RN  Scrub Person: Jose Cortes         Drains and/or Catheters: * None in log *    Tourniquet Times:   69 minutes    Implants:  Implants       Type Name Action Serial No.      Joint INSERT, TIBIAL, X3 TRIATHLON CS, SZ-6 13MM - [id number] Implanted      Joint COMPONENT, CR FEMORAL, P5, BEADED W/PA, RIGHT - [id number] Implanted      Joint BASEPLATE, TRIATHLON  TRITANIUM, SIZE 6, 52MM - [id number] Implanted      Joint PATELLA, TRIATHLON, ASYMMETRIC,  SIZE A32 - [id number] Implanted               Findings: See dictation below    Indications: Víctor Madison is an 78 y.o. male who is having surgery for Primary osteoarthritis of right knee [M17.11].  In the form of a right Rey assisted total knee arthroplasty    The patient was seen in the preoperative area. The risks, benefits, complications, treatment options, non-operative alternatives, expected recovery and outcomes were discussed with the patient. The possibilities of reaction to medication, pulmonary aspiration, injury to surrounding structures, bleeding, recurrent infection, the need for additional procedures, failure to diagnose a condition, and creating a complication requiring transfusion or operation were discussed with the patient. The patient concurred with the proposed plan, giving informed consent.  The site of surgery was properly noted/marked if necessary per policy. The patient has been actively warmed in preoperative area. Preoperative antibiotics have been ordered and given within 1 hours of incision. Venous thrombosis prophylaxis have been ordered including unilateral sequential compression device    Procedure Details: Date of surgery: 5/6/2024    [facility]    Pre-Operative Diagnosis: Right knee end-stage bone-on-bone tricompartmental osteoarthritis with proximal tibial varus deformity and varus thrust instability    Post-Operative Diagnosis: Right knee end-stage bone-on-bone tricompartmental osteoarthritis with proximal tibial varus deformity and varus thrust instability    Procedure: Right Rey assisted total knee arthroplasty    Implants:  1.  [place] triathlon press-fit cruciate retaining femur-size 5  2.  Lei triathlon TriTanium press-fit tibial baseplate-size 6  3.  Lei X.3, cruciate stabilized polyethylene-size 6 x 13 mm thickness  4.  Lei TriTanium metal-backed  press-fit X.3 asymmetric patella button-size A32    Surgeon: Brennan Trotter DO    First Assistant: Saurav Babcock PA-C    Intraoperative pathology and findings/operative indications:  The patient is a pleasant 78-year-old male longstanding history of pain and instability related to his right knee getting progressively worse over the last few years.  Patient has failed numerous attempts at conservative management history of arthroscopy history of intra-articular injections various medications activity modifications physical therapy regimens and bracing.  Patient admits to sense of instability about his knee especially on uneven ground difficulty going from sit to stand inability to ambulate more than short distances without significant swelling or discomfort in his knee.  Occasional difficulty sleeping at night.  Physical exam revealed antalgic gait obvious varus thrust with ambulation.  Gross inspection of the leg revealed obvious varus and elation minimal if at all correctable towards neutral but obvious varus thrust instability noted range of motion reasonably well-preserved 0 degrees of extension flexion 135 degrees crepitance throughout the arc of motion palpable arthritic effusion noted on exam.  Preoperative radiographs revealed severe bone-on-bone tricompartmental osteoarthritis bone-on-bone apposition in the medial patellofemoral compartments lateral tibial thrust underneath the femur on the AP view which reduced on the Perdomo view hypertrophic osteophytic formation tricompartmentally subchondral cystic changes subchondral sclerosis.  With continued pain and disability related to his right knee having failed many years of conservative management ultimately the patient did choose to move forward with total knee arthroplasty.  At the time of the procedure exam under anesthesia revealed range of motion 0 degrees of extension with manual overpressure going into roughly 5 degrees of recurvatum flexion to 135  degrees proximal tibial varus minimally correctable towards neutral again obvious varus thrust instability appreciated.  Intraoperatively he was found to have a arthritic related joint effusion hypertrophic arthritic related synovitis complete articular cartilage loss noted within the medial patellofemoral compartments macerated articular cartilage about the lateral compartment macerated medial and lateral meniscus tissue very little medial meniscus tissue remained.  Hypertrophic osteophytic formation tricompartmentally mucoid degeneration of the cruciate ligaments.  Bone quality reasonably well-preserved without major indications of osteopenia or osteoporosis.  There was no indication of infection at the time of the procedure.    Procedure in detail:  Patient was correctly identified and marked in preoperative holding risks benefits alternatives and reasonable expectations for outcomes have previously been discussed.  Also in preoperative holding the patient received a regional block by the Department of Anesthesia.  The patient was escorted to operative suite #4 at [facility], once in the operative suite surgical pause/time-out was performed preoperative antibiotics were administered and spinal anesthetic was provided by the department of anesthesia.  Patient is positioned supine on a standard operative table bony prominences well padded nonsterile tourniquet applied to right proximal thigh.  The right lower extremity was then sterilely prepped and draped in standard fashion.  Anatomic landmarks were identified and marked with sterile marking pen.  Esmarch bandage was applied knee was flexed and tourniquet was raised to 250 millimeters of mercury.  A standard anterior incision was then created with a 10 blade beginning 3 fingerbreadths proximal to the superior pole of the patella carried distally towards the medial aspect of the tibial tubercle.  Careful dissection through the subcutaneous tissues deep  to level of Stulberg's fascia was performed creating a medial flap only.  Once the extensor mechanism was adequately exposed a fresh 10 blade was used to make a standard medial parapatellar arthrotomy.  The deep MCL and medial capsule were then elevated off the medial tibial plateau with Bovie cautery and MCL retractor was inserted.  The knee was taken to full extension suprapatellar synovitis was then sharply excised with Bovie cautery.  The patella was everted and the knee was hyperflexed.  Hoffa's fat pad was sharply excised with a 10 blade.  The anterior cruciate ligament and posterior cruciate ligament were also sacrificed with a 10 blade.  Appropriate collateral retractors were then positioned.  At this time tibial and femoral checkpoints were inserted along with Rey array pins.  Arrays were appropriately oriented.  Hip center was checked.  Registration was then performed on both the femur and the tibia in standard fashion.  Medial tibial plateau and medial femoral condyle osteophytes were removed with a curved osteotome and rongeur.  Soft tissue balancing was then performed in extension and in flexion utilizing spoons.  Intraoperative Ery templating and component positioning was then performed excellent balance was achieved in the simulation.  At this time the Rey arm assist was brought into the operative field and sequential cuts were completed in standard fashion excess bone then removed with curved osteotome and rongeur.  Medial and lateral meniscus were sharply excised with Bovie cautery.  At this point the knee was taken to 90 degrees of flexion a lamina  was placed between the tibia and the femur and posterior femoral osteophytes were removed with a curved osteotome.  The tibia was once again subluxed anteriorly and sized a size 6 tibial trial was found to be most appropriate this was then provisionally pinned into place with rotation oriented towards the medial 1/3 of the tibial tubercle.   Trial polyethylene was inserted followed by trial femoral component.  The knee was then taken through range of motion varus valgus stability was assessed.  At this point the knee was found to achieve 0 degrees of extension to 142 degrees of flexion and the varus valgus stable at 0 degrees, 30 degrees, 60 degrees no pistoning noted at 90 degrees and the patella was found to track midline.  The knee was taken to full extension patellae everted native patella thickness was measured at 26 millimeters.  A patellar cutting clamp was then used to resect 10 millimeters of patellar bone 3 lug holes were created through the appropriate guide and a trial polyethylene button was attached.  The knee was again taken through range of motion the patella was again found to track midline.  At this point the trial polyethylene was removed 2 lug holes were created through the distal aspect of the trial femoral component which was then removed and the tibia was again subluxed anteriorly.  The smokestack guide was attached onto the trial tibial tray and a keel punch was passed trial tibial tray was then removed and the press-fit tibial lug hole guide was then attached and lug holes were drilled.  At this point the knee was taken to full extension and copiously irrigated with sterile saline via Simpulse lavage.  A pericapsular pain injection was then provided.  At this point implants were opened sequentially impacted into place and found of excellent fixation.  The knee was once again taken into full extension and copiously irrigated with dilute sterile betadine followed by repeat irrigation with sterile saline via Simpulse lavage.  The knee was taken through range of motion stability range of motion were found to be equal to that of the trial components.  At this point the knee was hyperflexed and the tourniquet was dropped at 69 minutes.  Hemostasis was achieved with Bovie cautery.  The knee was then taken into full extension in the  medial parapatellar arthrotomy was closed with a 0 Vicryl ligature in interrupted figure-of-eight and running locked fashion.  Deep subcutaneous tissues were closed with 2-O Vicryl ligature in buried interrupted fashion skin was reapproximated with 4 Monocryl in running subcuticular fashion followed by application of Dermabond.  A self adherent Mepilex Silver dressing was applied applied over top of the wound after the Dermabond was completely dry.  A Thigh high compressive stocking was then applied along with a iceman cooler.  Patient was then woken transferred to a hospital bed and returned to PACU in stable condition.  Counts were correct case was clean elective complications were none, tourniquet time was 69 minutes, blood loss was 5 cc post tourniquet drop.          Complications:  None; patient tolerated the procedure well.    Disposition: PACU - hemodynamically stable.  Condition: stable         Additional Details:     Attending Attestation: I performed the procedure.    Brennan Trotter  Phone Number: [phone]

## 2024-05-06 NOTE — PROGRESS NOTES
Physical Therapy    Physical Therapy Evaluation    Patient Name: Víctor Madison  MRN: [id number]  Today's Date: 5/6/2024   Time Calculation  Start Time: 1517  Stop Time: 1556  Time Calculation (min): 39 min    Assessment/Plan   PT Assessment  PT Assessment Results: Decreased strength, Decreased range of motion, Decreased mobility, Orthopedic restrictions  Rehab Prognosis: Excellent  Evaluation/Treatment Tolerance: Patient tolerated treatment well  End of Session Communication: Bedside nurse  Assessment Comment: The patient is a 78 y.o. male here for same day right TKA.  He was  independent PTA.  He presents with impaired right quad control/strength, impaired right knee ROM and impaired mobility and gait quality.  These functional limitations are anticipated post-operatively.  The patient demonstrates very good safety awareness and technique with a.d. during functional transfers, gait and stair negotiation. His safety with mobility is adequate for discharge.  Low intensity rehab is recommended.  End of Session Patient Position:  (in w/c preparing for discharge)  IP OR SWING BED PT PLAN  Inpatient or Swing Bed: Inpatient  PT Plan  Treatment/Interventions: Bed mobility, Transfer training, Gait training, Stair training, Strengthening, Range of motion, Therapeutic exercise, Therapeutic activity, Home exercise program  PT Plan: Skilled PT  PT Eval Only Reason: Safe to return home (All goals met post-op TKA)  PT Discharge Recommendations: Low intensity level of continued care  Equipment Recommended upon Discharge: Wheeled walker (patient owns)  PT Recommended Transfer Status: Assist x1, Contact guard  PT - OK to Discharge: Yes      Subjective   General Visit Information:  General  Reason for Referral: Recent surgery, right TKR  Referred By: Dr. Trotter  Past Medical History Relevant to Rehab: OA, Hyperlipemia  Family/Caregiver Present: Yes  Prior to Session Communication: Bedside nurse  Preferred Learning Style:  (Seated at  EOB)  General Comment: Patient was pleasant and cooperative,  Home Living:  Home Living  Type of Home: House  Lives With: Spouse  Home Adaptive Equipment: Walker rolling or standard, Cane  Home Layout: Two level  Home Access: Stairs to enter without rails  Entrance Stairs-Rails: None  Entrance Stairs-Number of Steps: 3 (or 1 JENNIFER if entring through front door)  Bathroom Shower/Tub: Walk-in shower  Bathroom Toilet: Handicapped height  Bathroom Equipment: Built-in shower seat, Grab bars in shower  Bathroom Accessibility: on 2nd floor  Prior Level of Function:  Prior Function Per Pt/Caregiver Report  Level of [place]: Independent with ADLs and functional transfers, Independent with homemaking with ambulation  Receives Help From: Family  ADL Assistance: Independent  Homemaking Assistance: Independent  Ambulatory Assistance: Independent  Vocational: Retired  Precautions:  Precautions  LE Weight Bearing Status: Weight Bearing as Tolerated  Medical Precautions: Fall precautions  Post-Surgical Precautions: Right total knee precautions  Precautions Comment: patient was able to recall 5/5 TKR precautions       Objective   Pain:  Pain Assessment  Pain Assessment: 0-10  Pain Score: 0 - No pain  Cognition:  Cognition  Overall Cognitive Status: Within Functional Limits    General Assessments:        Activity Tolerance  Endurance: Endurance does not limit participation in activity    Sensation  Light Touch: No apparent deficits    Strength  Strength Comments: Left LE WNL, Right LE: Ankle DF 5/5, good quad set, SLR with slight quad lag, moves leg through abduction and adduction without assistance.  Static Sitting Balance  Static Sitting-Balance Support: Bilateral upper extremity supported  Static Sitting-Level of Assistance: Independent    Static Standing Balance  Static Standing-Balance Support: Bilateral upper extremity supported  Static Standing-Level of Assistance: Contact guard  Functional Assessments:  Bed Mobility  Bed  "Mobility: Yes  Bed Mobility 1  Bed Mobility 1: Supine to sitting, Sitting to supine  Level of Assistance 1: Independent  Bed Mobility Comments 1: HOB elevated    Transfers  Transfer: Yes  Transfer 1  Transfer From 1: Sit to, Stand to  Transfer to 1: Stand, Sit  Technique 1: Sit to stand, Stand to sit  Transfer Device 1: Gait belt, Walker  Transfer Level of Assistance 1: Contact guard  Trials/Comments 1: x 2 trials (Patient exhibited right knee slight buckle on first attempt.  Once cued to tighten the thigh/quad, he was able to control this from happening.)    Ambulation/Gait Training  Ambulation/Gait Training Performed: Yes  Ambulation/Gait Training 1  Surface 1: Level tile  Device 1: Rolling walker  Gait Support Devices: Gait belt  Assistance 1: Contact guard  Quality of Gait 1: Inconsistent stride length, Decreased step length  Comments/Distance (ft) 1: Pt ambualted approximately 150 feet x 1 with RW with CGA and vc's for keeping right quad tight in stance phase    Stairs  Stairs: Yes  Stairs  Rails 1: Right  Device 1: Single point cane  Support Devices 1: Gait belt  Assistance 1: Contact guard  Comment/Number of Steps 1: 3, 6\" steps x 2 trials (vc's provided to keep right quads tight in stance phase, and for proper sequencing.  Able to perform without vc's on 2nd trial.)  Extremity/Trunk Assessments:  RLE   RLE :  (On observation while pt was sitting, right knee flexion was approximately  75 degrees)  LLE   LLE : Within Functional Limits  Outcome Measures:  [facility] Basic Mobility  Turning from your back to your side while in a flat bed without using bedrails: None  Moving from lying on your back to sitting on the side of a flat bed without using bedrails: None  Moving to and from bed to chair (including a wheelchair): A little  Standing up from a chair using your arms (e.g. wheelchair or bedside chair): A little  To walk in hospital room: A little  Climbing 3-5 steps with railing: A little  Basic Mobility - Total " Score: 20    Encounter Problems       Encounter Problems (Resolved)       PT Problem       Patient will demonstrate knowledge and application of left TKA post-op protocol including recalling all precautions, performance of initial HEP and safety with a.d. during functional transfers and gait.   (Met)       Start:  05/06/24    Expected End:  05/06/24    Resolved:  05/06/24                Education Documentation  Handouts, taught by Nargis Loaiza, PT at 5/6/2024  5:21 PM.  Learner: Family, Patient  Readiness: Eager  Method: Explanation, Demonstration  Response: Verbalizes Understanding, Demonstrated Understanding    Precautions, taught by Nargis Loaiza, PT at 5/6/2024  5:21 PM.  Learner: Family, Patient  Readiness: Eager  Method: Explanation, Demonstration  Response: Verbalizes Understanding, Demonstrated Understanding    Body Mechanics, taught by Nargis Loaiza PT at 5/6/2024  5:21 PM.  Learner: Family, Patient  Readiness: Eager  Method: Explanation, Demonstration  Response: Verbalizes Understanding, Demonstrated Understanding    Home Exercise Program, taught by Nargis Loaiza, PT at 5/6/2024  5:21 PM.  Learner: Family, Patient  Readiness: Eager  Method: Explanation, Demonstration  Response: Verbalizes Understanding, Demonstrated Understanding    Mobility Training, taught by Nargis Loaiza PT at 5/6/2024  5:21 PM.  Learner: Family, Patient  Readiness: Eager  Method: Explanation, Demonstration  Response: Verbalizes Understanding, Demonstrated Understanding    Education Comments  No comments found.    Patient was instructed on all TKA precautions and was able to recall 5/5.  He and his wife were instructed on safety principles with use of a.d. during transfers, gait and stair negotiation.  He performed initial HEP: AP, QS, HS, SLR, hip abduction/adduction x 5-10 reps each.  (He was able to recall SAQs but did not perform them.) He was provided with a printed handout of all education points.

## 2024-05-06 NOTE — ANESTHESIA POSTPROCEDURE EVALUATION
Patient: Víctor Madison    Procedure Summary       Date: 05/06/24 Room / Location: [facility] OR 04 / [facility]    Anesthesia Start: 0936 Anesthesia Stop: 1252    Procedure: MASTER ROBOT OPEN TOTAL KNEE ARTHROPLASTY (Right: Knee) Diagnosis:       Primary osteoarthritis of right knee      (Primary osteoarthritis of right knee [M17.11])    Surgeons: Brennan Trotter DO Responsible Provider: Clint Kapadia MD    Anesthesia Type: spinal, regional ASA Status: 3            Anesthesia Type: spinal, regional    Vitals Value Taken Time   /53 05/06/24 1305   Temp 36.6 °C (97.9 °F) 05/06/24 1247   Pulse 56 05/06/24 1305   Resp 16 05/06/24 1305   SpO2 99 % 05/06/24 1305       Anesthesia Post Evaluation    Patient location during evaluation: PACU  Patient participation: complete - patient participated  Level of consciousness: awake  Pain score: 2  Pain management: adequate  Multimodal analgesia pain management approach  Airway patency: patent  Two or more strategies used to mitigate risk of obstructive sleep apnea  Cardiovascular status: acceptable  Respiratory status: acceptable  Hydration status: acceptable  Postoperative Nausea and Vomiting: none    No notable events documented.

## 2024-05-06 NOTE — DISCHARGE SUMMARY
Discharge Diagnosis  Status post total knee replacement, right    Issues Requiring Follow-Up  PO R TKA    Test Results Pending At Discharge  Pending Labs       No current pending labs.            Hospital Course   The patient is a pleasant 78 year old male who underwent an elective right total knee arthroplasty using MASTER performed by Dr. Trotter at [facility] on 5/6/2024.  Patient had a routine uncomplicated preoperative, intraoperative and immediate postoperative surgical course.  As planned postoperatively the patient was admitted to the regular nursing floor at [facility].  While in the regular nursing floor patient received consultations from both internal medicine as well as physical therapy.  Patient received preoperative and postoperative intravenous antibiotics.  Pain control is with a combination of both oral and IV narcotic and nonnarcotic medications.  DVT prophylaxis was with sequentials early ambulation and aspirin therapy.  Anticoagulation medications will be continued for minimum of 30 days postsurgery.  Patient was discharged home with home physical therapy and home health care outpatient follow-up is being arranged for 2 weeks in the outpatient clinic postdischarge.      Pertinent Physical Exam At Time of Discharge  Physical Exam  Cardiovascular:      Rate and Rhythm: Normal rate.   Pulmonary:      Breath sounds: Normal breath sounds.   Abdominal:      Palpations: Abdomen is soft.   Musculoskeletal:         General: Swelling and tenderness present.      Right lower leg: Edema present.      Comments: PO bandages appreciated on operative side    Neurological:      Mental Status: He is alert.         Home Medications     Medication List      CONTINUE taking these medications     ascorbic acid 500 mg tablet; Commonly known as: Vitamin C   aspirin 325 mg EC tablet   atorvastatin 20 mg tablet; Commonly known as: Lipitor; TAKE 1 TABLET   DAILY   carisoprodol 350 mg tablet; Commonly  known as: Soma   celecoxib 200 mg capsule; Commonly known as: CeleBREX   docusate sodium 100 mg capsule; Commonly known as: Colace   gabapentin 300 mg capsule; Commonly known as: Neurontin   levothyroxine 50 mcg tablet; Commonly known as: Synthroid, Levoxyl; TAKE   1 TABLET DAILY   oxyCODONE-acetaminophen 5-325 mg tablet; Commonly known as: Percocet   tamsulosin 0.4 mg 24 hr capsule; Commonly known as: Flomax; Take 2   capsules (0.8 mg) by mouth once daily.     STOP taking these medications     chlorhexidine 0.12 % solution; Commonly known as: Peridex       Outpatient Follow-Up  Future Appointments   Date Time Provider Department Center   7/11/2024 10:00 AM Monae Moura MD [id number] [facility] in 2 weeks at [facility] 5/21/2024    Saurav Babcock PA-C

## 2024-05-06 NOTE — ANESTHESIA PROCEDURE NOTES
Spinal Block    Patient location during procedure: OR  Start time: 5/6/2024 9:46 AM  End time: 5/6/2024 9:52 AM  Reason for block: primary anesthetic and procedure for pain  Staffing  Performed: CRNA   Authorized by: Clint Kapadia MD    Performed by: RENNY Ibarra    Preanesthetic Checklist  Completed: patient identified, IV checked, risks and benefits discussed, surgical consent, monitors and equipment checked, pre-op evaluation, timeout performed and sterile techniques followed  Block Timeout  RN/Licensed healthcare professional reads aloud to the Anesthesia provider and entire team: Patient identity, procedure with side and site, patient position, and as applicable the availability of implants/special equipment/special requirements.  Patient on coagulant treatment: no  Timeout performed at: 5/6/2024 9:46 AM  Spinal Block  Patient position: sitting  Prep: Betadine  Sterility prep: cap, drape, gloves, hand hygiene and mask  Sedation level: light sedation  Patient monitoring: blood pressure, heart rate and continuous pulse oximetry  Approach: midline  Vertebral space: L4-5  Injection technique: single-shot  Needle  Needle type: pencil-point   Needle gauge: 24 G  Needle length: 3.5 in  Free flowing CSF: yes    Assessment  Sensory level: T6 bilateral  Block outcome: patient comfortable  Procedure assessment: patient sedated but conversant throughout procedure and patient tolerated procedure well with no immediate complications  Additional Notes  Sterile betadine prep and drape.  1% lidocaine local to L4/5 space.  Easy spinal x1 attempt.  Epi wash used due to doing under [employer] robot.  Patient tolerated procedure with no complaints.  Surgical level achieved.

## 2024-05-06 NOTE — ANESTHESIA PREPROCEDURE EVALUATION
Patient: Víctor Madison    Procedure Information       Date/Time: 05/06/24 0930    Procedure: MASTER ROBOT OPEN TOTAL KNEE ARTHROPLASTY (Right: Knee) - MASTER RIGHT TOTAL KNEE ARTHROPLASTY    Location: [facility] OR 04 / [facility]    Surgeons: Brennan Trotter DO     Vitals:    05/06/24 0707   BP: 158/82   Pulse: 67   Temp: 36 °C (96.8 °F)   SpO2: 96%       Past Surgical History:   Procedure Laterality Date   • UMBILICAL HERNIA REPAIR       Past Medical History:   Diagnosis Date   • BPH (benign prostatic hyperplasia)    • Elevated PSA    • Hyperlipidemia    • Hypothyroidism    • Primary osteoarthritis of right knee        Current Facility-Administered Medications:   •  ceFAZolin in dextrose (iso-os) (Ancef) IVPB 2 g, 2 g, intravenous, Once, Saurav Babcock PA-C  •  lactated Ringer's infusion, 100 mL/hr, intravenous, Continuous, Jemal Jones MD, Last Rate: 100 mL/hr at 05/06/24 0814, 100 mL/hr at 05/06/24 0814  •  lactated Ringer's infusion, 100 mL/hr, intravenous, Continuous, Jemal Jones MD  •  tranexamic acid (Cyklokapron) bolus from bag 720 mg, 10 mg/kg, intravenous, Once, Saurav Babcock PA-C  Prior to Admission medications    Medication Sig Start Date End Date Taking? Authorizing Provider   atorvastatin (Lipitor) 20 mg tablet TAKE 1 TABLET DAILY 12/18/23  Yes Monae Moura MD   chlorhexidine (Peridex) 0.12 % solution Use 15 mL in the mouth or throat once daily. Swish and spit one capful the night before surgery and morning  of surgery 4/18/24 7/17/24 Yes Brittni Guthrie APRN-CNP   levothyroxine (Synthroid, Levoxyl) 50 mcg tablet TAKE 1 TABLET DAILY 12/18/23  Yes Monae Moura MD   tamsulosin (Flomax) 0.4 mg 24 hr capsule Take 2 capsules (0.8 mg) by mouth once daily.  Patient taking differently: Take 1 capsule (0.4 mg) by mouth 2 times a day. 1/4/24  Yes Monae Moura MD     No Known Allergies  Social History     Tobacco Use   • Smoking status: Never   • Smokeless tobacco: Never   Substance Use Topics   • Alcohol  "use: Yes     Comment: rare         Chemistry    Lab Results   Component Value Date/Time     04/18/2024 1210    K 4.6 04/18/2024 1210     04/18/2024 1210    CO2 33 (H) 04/18/2024 1210    BUN 17 04/18/2024 1210    CREATININE 1.35 (H) 04/18/2024 1210    Lab Results   Component Value Date/Time    CALCIUM 9.1 04/18/2024 1210    ALKPHOS 68 04/08/2023 0827    AST 14 04/08/2023 0827    ALT 15 04/08/2023 0827    BILITOT 0.7 04/08/2023 0827          Lab Results   Component Value Date/Time    WBC 5.4 04/18/2024 1210    HGB 13.8 04/18/2024 1210    HCT 44.5 04/18/2024 1210     04/18/2024 1210     No results found for: \"PROTIME\", \"PTT\", \"INR\"  Encounter Date: 04/18/24   ECG 12 lead   Result Value    Ventricular Rate 50    Atrial Rate 50    SC Interval 176    QRS Duration 98    QT Interval 448    QTC Calculation(Bazett) 408    P Axis 64    R Axis 61    T Axis 43    QRS Count 9    Q Onset 215    P Onset 127    P Offset 179    T Offset 439    QTC Fredericia 421    Narrative    Sinus bradycardia  Otherwise normal ECG  No previous ECGs available     No results found for this or any previous visit from the past 1095 days.        Relevant Problems   Cardiac   (+) Hyperlipidemia      Endocrine   (+) Hypothyroidism       Clinical information reviewed:   Tobacco  Allergies  Meds   Med Hx  Surg Hx   Fam Hx  Soc Hx        NPO Detail:  NPO/Void Status  Date of Last Liquid: 05/05/24  Time of Last Liquid: 1800 (sips this am with meds)  Date of Last Solid: 05/05/24  Time of Last Solid: 1800  Time of Last Void: 0745         Physical Exam    Airway  Mallampati: II  TM distance: >3 FB  Neck ROM: full     Cardiovascular - normal exam     Dental    Pulmonary - normal exam     Abdominal - normal exam         Anesthesia Plan    History of general anesthesia?: yes  History of complications of general anesthesia?: no    ASA 3     spinal and regional   (R ACB)  The patient is not a current smoker.    intravenous induction "   Postoperative administration of opioids is intended.  Trial extubation is planned.  Anesthetic plan and risks discussed with patient.  Use of blood products discussed with patient who.    Plan discussed with CRNA and attending.

## 2024-05-06 NOTE — BRIEF OP NOTE
Date: 2024  OR Location: [facility] OR    Name: Víctor Madison, : 1945, Age: 78 y.o., MRN: [id number], Sex: male    Diagnosis  Pre-op Diagnosis     * Primary osteoarthritis of right knee [M17.11] Post-op Diagnosis     * Primary osteoarthritis of right knee [M17.11]     Procedures  MASTER ROBOT OPEN TOTAL KNEE ARTHROPLASTY  [id number] - WA ARTHRP KNE CONDYLE&PLATU MEDIAL&LAT COMPARTMENTS      Surgeons      * Brennan Trotter - Primary    Resident/Fellow/Other Assistant:  Saurav Babcock PA-C    Procedure Summary  Anesthesia: Consult  ASA: III  Anesthesia Staff: Anesthesiologist: Clint Kapadia MD  CRNA: YADIRA Ibarra-CRNA  Estimated Blood Loss: 5mL  Intra-op Medications:   Administrations occurring from 0930 to 1200 on 24:   Medication Name Total Dose   EPINEPHrine (Adrenalin) 0.2 mL, ketorolac (Toradol) 30 mg, morphine PF (Duramorph) 5 mg, ropivacaine (Naropin) 5 mg/ml (0.5 %) 30 mL in sodium chloride 0.9% 20 mL syringe 56.2 mL   ceFAZolin in dextrose (iso-os) (Ancef) IVPB 2 g 2 g              Anesthesia Record               Intraprocedure I/O Totals          Intake    Tranexamic Acid 0.00 mL    The total shown is the total volume documented since Anesthesia Start was filed.    Propofol Drip 0.00 mL    The total shown is the total volume documented since Anesthesia Start was filed.    Total Intake 0 mL          Specimen: No specimens collected     Staff:   Circulator: Marguerite Lynn RN  Relief Circulator: Clint Kumari RN  Scrub Person: Jose Cortes          Findings: see op note.     Complications:  None; patient tolerated the procedure well.     Disposition: PACU - hemodynamically stable.  Condition: stable  Specimens Collected: No specimens collected  Attending Attestation: I was present and scrubbed for the entire procedure.    Brennan Trotter  Phone Number: [phone]

## 2024-05-06 NOTE — HH CARE COORDINATION
Home Care received a Referral for Physical Therapy. We have processed the referral for a Start of Care on 05/07.     If you have any questions or concerns, please feel free to contact us at [phone]. Follow the prompts, enter your five digit zip code, and you will be directed to your care team on [facility] 1.

## 2024-05-06 NOTE — DISCHARGE INSTRUCTIONS
[facility]                          Brennan Trotter,   Phone: [phone]    POSTOPERATIVE INSTRUCTIONS: TOTAL HIP & TOTAL KNEE ARTHROPLASTY    General/highlights: Flex/tighten the muscles in the buttocks, thighs and calves often when in chair or bed.  Utilize the incentive spirometer often especially the next 3 days.  Walk at least 10 steps every hour that you are awake.  Take stairs 1 at a time, good leg leads up, bed leg legs down, use the handrails.  You may be weightbearing as tolerated, in general the walker is used for 2 weeks.    PAIN, SWELLING & BRUISING  Some pain, stiffness and swelling is normal for up to 1 year after surgery.  Pain will start to let up over time depending on your activity level.  It is preferable to rest for 24 hours following your surgery  Pain is often delayed for 24-48 hours after surgery.  Pain may be dull/achy, throbbing, or even sharp/nerve sensations  It is normal for swelling and bruising to worsen before it gets better.  These symptoms usually peak 1 week after surgery  Swelling and bruising may appear throughout the leg, all the way down to your toes  Wear your compression stockings every day during the day for 14 days and remove them at night.  This will help control swelling    WOUND CARE INSTRUCTIONS  Your surgical bandage will be removed 2 weeks after surgery at your post-operative visit.  If your bandage becomes compromised, begins to come off before then, or soaks through with drainage call the office immediately.  You may have sutures under the skin that dissolve on their own over time.    As the sutures absorb occasionally a small suture abscess can develop, this is not uncommon for up to 6 weeks, if this occurs please notify your surgeon immediately.    HYGIENE  You may shower 24 hours after your surgery, provided the Mepilex silver dressing is in place.  No tub bathing or submerging underwater.  Do not scrub directly over the  surgical bandage  Do not use any creams, lotions or ointments on the surgical leg for 4 weeks after surgery, or until you have been cleared to do so by your surgeon    GENERAL INSTRUCTIONS  Do not drink alcoholic beverages (beer and wine included) for 24 hours following your surgery, or while you are taking narcotic pain medications  Delay making important decisions until you are fully recovered  You cannot swim or submerge in water for at least 6 weeks after surgery, or until you are cleared by your surgeon.  You may start kneeling 3 months after knee replacement surgery, once you have been cleared by your surgeon.  This may not ever feel “normal” or comfortable    HOME DIET  Resume your normal diet after surgery. If you are on a specific type of diet for your condition, resume that instead.    Choose foods that help promote good bowel habits and prevent constipation, such as foods high in fiber.          POSTOPERATIVE MEDICATIONS    Pain medications have been ordered to help manage pain throughout recovery.  While you are using narcotic pain medication, you should be using a stool softener or laxative to prevent constipation.  My preference is parallax powder once or twice a day when taking the narcotic pain medicine  It is important to eat a small meal or snack before taking pain medications to avoid nausea or stomach upset.    MEDICATION REFILLS - [phone]    If you need to request a medication refill, please call the office between 8:30am-4:30pm, Monday through Friday.    Any calls received outside of this timeframe will be handled on the next business day.    Medication requests received on Saturday or Sunday will be handled on Monday.    Please allow 3-5 business days for all medication requests to be processed.    RESTARTING HOME MEDICATIONS  You may restart your home medications the following day after your surgery UNLESS you have been given alternate instructions.    Follow the instructions given to  "you on your hospital discharge instructions for more information regarding your home medications.    ASSISTIVE DEVICE & MOVEMENT  Initially, you will use a walker or crutches to walk for the first 2 weeks.  Once your therapist feels you are ready, you will wean to one crutch or cane followed by no assistive device.  It is important to keep moving throughout recovery.  Walk at least 10 steps every hour that you are awake.  Stairs are part of your recovery, the \"good \"leg leads on the way up, the \"bad \"leg leads on the way down to, use the handrails and not the walker or crutches.  You should be up and walking around several times per day as well as bending your knee and making sure your knee is going completely straight (for knee replacements).  For anterior hip replacements avoid straight leg raise.    NUMBNESS/CLICKING    Decreased sensation or numbness is common on or around the area of the incision due to sensory nerves that are affected at the time of surgery.  The area of numbness will be lateral to the incision  You might always have numbness but the size of the area of numbness should decrease with time.  It is common for patients to have a “click” in the knee with movement.  This is usually nothing to be concerned about.  There are several reasons why your knee can be making these noises, including the implant components rubbing against each other, or a tendons going over a bony prominence.  Grinding can also occur and is not out of the ordinary.  Grinding can be caused by scar tissue formation  Noises will often settle over time once muscle strength improves.    DIFFICULTY SLEEPING    It is very common for patients to have difficulty sleeping at night which can be caused pain, medication, or feeling of anxiety.  Sleep disturbance typically worsens 4-6 weeks after surgery.  You are permitted to sleep on your back or side with a  pillow between your legs for comfort            DRIVING & TRAVEL  Your surgeon " will address this at your post-op appointment.  You must not be taking narcotic pain medication to be cleared to drive.  LEFT LEG JOINT REPLACMENT:  You may drive once you have regained full control of your leg, typically around 2 week after surgery  RIGHT LEG JOINT REPLACEMENT:  You must speak with your surgeon before you resume driving.  Driving can typically be resumed by 4-6 weeks after surgery.  During long distance travel, you should attempt to change position or stand every hour.  You should complete ankle pumps throughout your travel if you are sitting for long periods of time.  If traveling within the first 2 weeks after surgery, you should wear your compression stockings.    DENTAL & OTHER PROCEDURES    All patients must wait a minimum of 3 months for elective procedures, including routine dental cleanings.  For any dental appointment - cleaning or dental procedures - patients must take a prophylactic antibiotic 1 hour before the appointment.  You will also need to call for an antibiotic prior to any other invasive test, procedure, or surgery.  These prophylactic antibiotics will be needed for the rest of your life, in order to prevent infections.  Please call your surgeons office at [phone] to request the antibiotic.      PHYSICAL THERAPY    Following surgery it is important to progress through recovery with in-home or outpatient physical therapy.  You should continue to complete home exercises provided from the hospital on days that you are not working with a physical therapist.    It is common to have a temporary increase in pain and swelling upon starting outpatient physical therapy and/or changing your exercise routine.  Continue to use ice to help with symptoms.     FOLLOW-UP APPOINTMENT - [phone]    Your post-surgical appointments will take place approximately 2 weeks, 6 weeks, 3 months and 1 year following surgery.  Joint replacements are monitored thereafter every 2-5 years for life.  If  you have any questions or need to make changes to this appointment, please contact the office:    EMERGENCIES & WHEN TO CALL YOUR SURGEON  When to contact our office immediately:  Any falls or injury to the joint replacement  Fever >101.5 for at least 48 hours after surgery or chills.  Excessive bleeding from incision(s). A small amount of drainage is normal and expected.  Signs of infection of incision(s)-excessive drainage that is soaking through your dressing (especially if it is pus-like), redness that is spreading out from the edges of your incision, or increased warmth around the area.  Excruciating pain for which the pain medication, taken as instructed, is not helping.  Severe calf pain.  Go directly to the emergency room or call 911, if you are experiencing chest pain or difficulty breathing.              ICE/COLD THERAPY  Ice is most important during the first 2 weeks after surgery, but should be used for several weeks as needed.  Never place ice, or cold therapy devices directly on the skin.  You should always have a protective layer between your skin and the cold.  You have been prescribed to ice your total joint at a minimum of twice per hour for 20 minutes while awake during the first 6 weeks after surgery if you are using ice packs. This will help with pain control.  If you are using an ice machine, please follow ice machine instructions.  After knee replacement is extremely important to elevate the leg straight on an incline, not flat.    COLD THERAPY MACHINE RECOMMENDATIONS      Cold therapy devices can be used before and after surgery to assist in comfort and help to reduce pain and swelling.  These devices differ from ice or ice packs whereas the mechanism circulates water through tubing and a pad to provide longer periods of cold therapy to the desired site.  While in the hospital, you can use your cold devices around the clock for optimal comfort.  We recommend using cold therapy after working  with therapy or completing exercises on your own.  Once you are discharged home, there is no set schedule in which you must follow while using cold therapy.  Below are a few points to remember when using a cold therapy device:    Read the 's instructions prior to first the use.  Follow instructions for filling the cooler (water first, then ice).  Always make sure there is a layer of protection between the cold pad and your skin (Clothing, Towel, Ace Bandage, etc.)  Allow the device to circulate cold water throughout the pad prior wrapping the pad around your leg (approximately 10 minutes).  Place the pad on your leg in the desired position to meet your pain management needs and use the wraps provided to secure the pad to your body.  The purpose of this device is to use consistently throughout the day.  You do not need to need to use the 20 on, 20 off method when using an ice machine.  During waking hours, remove the cold pad every 1-2 hours to perform a skin check  Detach the pad from the cooler and ambulate at least once every hour  After removing the pad, allow at least 30 minutes before resuming cold therapy  You may wear the cold therapy device during periods of sleep including overnight    If you wake up during the night, you can check the skin at this time.  You do not need to wake up specifically to perform skin checks.  Empty the cooler and pad when device is not in use.  Follow 's instructions for cleaning your cold therapy device.    [facility] can assist with problems related to products purchased through the hospital  [phone] - Kimmy   or   [phone] - [place]    Sample Medication Schedule  [facility]    Medication Refills - [phone] - Monday through Friday 8:30am-4:30pm  Please allow 3-5 days for medication refill requests to be processed.

## 2024-05-07 ENCOUNTER — HOME CARE VISIT (OUTPATIENT)
Dept: HOME HEALTH SERVICES | Facility: HOME HEALTH | Age: 79
End: 2024-05-07
Payer: MEDICARE

## 2024-05-07 VITALS
RESPIRATION RATE: 16 BRPM | DIASTOLIC BLOOD PRESSURE: 60 MMHG | SYSTOLIC BLOOD PRESSURE: 122 MMHG | HEART RATE: 67 BPM | TEMPERATURE: 98.9 F | OXYGEN SATURATION: 99 %

## 2024-05-07 PROCEDURE — 1090000002 HH PPS REVENUE DEBIT

## 2024-05-07 PROCEDURE — G0151 HHCP-SERV OF PT,EA 15 MIN: HCPCS | Mod: HHH

## 2024-05-07 PROCEDURE — 0023 HH SOC

## 2024-05-07 PROCEDURE — 169592 NO-PAY CLAIM PROCEDURE

## 2024-05-07 PROCEDURE — 1090000001 HH PPS REVENUE CREDIT

## 2024-05-07 SDOH — HEALTH STABILITY: PHYSICAL HEALTH
EXERCISE COMMENTS: PT INSTRUCTED PATIENT IN EXERCISES PER TKA PROTOCOL 1X10:  - ANKLE PUMPS  - GLUT SETS  - QUAD SETS  - HEELSLIDES  - HIP ABDUCTION  - SAQ  - LAQ  - SLR  - SEATED KNEE FLEXION STRETCH

## 2024-05-07 ASSESSMENT — ENCOUNTER SYMPTOMS
LOWEST PAIN SEVERITY IN PAST 24 HOURS: 0/10
HIGHEST PAIN SEVERITY IN PAST 24 HOURS: 6/10
PERSON REPORTING PAIN: PATIENT
PAIN: 1
SUBJECTIVE PAIN PROGRESSION: WAXING AND WANING

## 2024-05-08 ENCOUNTER — HOME CARE VISIT (OUTPATIENT)
Dept: HOME HEALTH SERVICES | Facility: HOME HEALTH | Age: 79
End: 2024-05-08
Payer: MEDICARE

## 2024-05-08 PROCEDURE — 1090000001 HH PPS REVENUE CREDIT

## 2024-05-08 PROCEDURE — 1090000002 HH PPS REVENUE DEBIT

## 2024-05-08 ASSESSMENT — ACTIVITIES OF DAILY LIVING (ADL)
ENTERING_EXITING_HOME: STAND BY ASSIST
OASIS_M1830: 03
AMBULATION ASSISTANCE ON FLAT SURFACES: 1

## 2024-05-09 PROCEDURE — 1090000001 HH PPS REVENUE CREDIT

## 2024-05-09 PROCEDURE — 1090000002 HH PPS REVENUE DEBIT

## 2024-05-10 ENCOUNTER — HOME CARE VISIT (OUTPATIENT)
Dept: HOME HEALTH SERVICES | Facility: HOME HEALTH | Age: 79
End: 2024-05-10
Payer: MEDICARE

## 2024-05-10 VITALS
DIASTOLIC BLOOD PRESSURE: 60 MMHG | TEMPERATURE: 98.8 F | SYSTOLIC BLOOD PRESSURE: 130 MMHG | RESPIRATION RATE: 16 BRPM | OXYGEN SATURATION: 100 % | HEART RATE: 75 BPM

## 2024-05-10 PROCEDURE — G0151 HHCP-SERV OF PT,EA 15 MIN: HCPCS | Mod: HHH

## 2024-05-10 PROCEDURE — 1090000002 HH PPS REVENUE DEBIT

## 2024-05-10 PROCEDURE — 1090000001 HH PPS REVENUE CREDIT

## 2024-05-10 SDOH — HEALTH STABILITY: PHYSICAL HEALTH
EXERCISE COMMENTS: PT INSTRUCTED PATIENT IN EXERCISES PER TKA PROTOCOL 1X10:  - ANKLE PUMPS  - GLUT SETS  - QUAD SETS  - HEELSLIDES  - HIP ABDUCTION  - SAQ  - LAQ  - SLR  - SEATED KNEE FLEXION STRETCH    PATIENT TOLERATED EXERCISES WELL.  PT EDUCATED PATIENT ON IMPORTA

## 2024-05-10 SDOH — HEALTH STABILITY: PHYSICAL HEALTH: EXERCISE COMMENTS: NCE OF CONTINUING HEP 3X DAILY IN ORDER TO MAXIMIZE STRENGTH AND ROM.

## 2024-05-10 ASSESSMENT — ENCOUNTER SYMPTOMS
PAIN: 1
PERSON REPORTING PAIN: PATIENT
LOWEST PAIN SEVERITY IN PAST 24 HOURS: 0/10
HIGHEST PAIN SEVERITY IN PAST 24 HOURS: 6/10

## 2024-05-11 PROCEDURE — 1090000001 HH PPS REVENUE CREDIT

## 2024-05-11 PROCEDURE — 1090000002 HH PPS REVENUE DEBIT

## 2024-05-12 PROCEDURE — 1090000001 HH PPS REVENUE CREDIT

## 2024-05-12 PROCEDURE — 1090000002 HH PPS REVENUE DEBIT

## 2024-05-13 ENCOUNTER — TELEPHONE (OUTPATIENT)
Dept: INPATIENT UNIT | Facility: HOSPITAL | Age: 79
End: 2024-05-13
Payer: MEDICARE

## 2024-05-13 ENCOUNTER — HOME CARE VISIT (OUTPATIENT)
Dept: HOME HEALTH SERVICES | Facility: HOME HEALTH | Age: 79
End: 2024-05-13
Payer: MEDICARE

## 2024-05-13 VITALS
DIASTOLIC BLOOD PRESSURE: 68 MMHG | SYSTOLIC BLOOD PRESSURE: 150 MMHG | HEART RATE: 72 BPM | TEMPERATURE: 98.7 F | OXYGEN SATURATION: 99 % | RESPIRATION RATE: 16 BRPM

## 2024-05-13 PROCEDURE — 1090000002 HH PPS REVENUE DEBIT

## 2024-05-13 PROCEDURE — 1090000001 HH PPS REVENUE CREDIT

## 2024-05-13 PROCEDURE — G0151 HHCP-SERV OF PT,EA 15 MIN: HCPCS | Mod: HHH

## 2024-05-13 SDOH — HEALTH STABILITY: PHYSICAL HEALTH: EXERCISE COMMENTS: LLY STRETCHING MORE AGGRESIVELY IN ORDER TO ACHIEVE MAXIMIUM ROM GAINS.

## 2024-05-13 SDOH — HEALTH STABILITY: PHYSICAL HEALTH
EXERCISE COMMENTS: PT INSTRUCTED PATIENT IN EXERCISES PER TKA PROTOCOL 1X15:  - ANKLE PUMPS  - GLUT SETS  - QUAD SETS  - HEELSLIDES  - HIP ABDUCTION  - SAQ  - LAQ  - SEATED HIP FLEXION  - SLR  - SEATED KNEE FLEXION STRETCH    PT EDUCATED PATIENT ON IMPORTANCE OF GRADUA

## 2024-05-13 ASSESSMENT — ENCOUNTER SYMPTOMS
HIGHEST PAIN SEVERITY IN PAST 24 HOURS: 4/10
PAIN: 1
LOWEST PAIN SEVERITY IN PAST 24 HOURS: 0/10
PERSON REPORTING PAIN: PATIENT

## 2024-05-14 PROCEDURE — 1090000001 HH PPS REVENUE CREDIT

## 2024-05-14 PROCEDURE — 1090000002 HH PPS REVENUE DEBIT

## 2024-05-15 ENCOUNTER — HOME CARE VISIT (OUTPATIENT)
Dept: HOME HEALTH SERVICES | Facility: HOME HEALTH | Age: 79
End: 2024-05-15
Payer: MEDICARE

## 2024-05-15 VITALS
OXYGEN SATURATION: 99 % | DIASTOLIC BLOOD PRESSURE: 54 MMHG | TEMPERATURE: 98.1 F | RESPIRATION RATE: 16 BRPM | SYSTOLIC BLOOD PRESSURE: 124 MMHG | HEART RATE: 70 BPM

## 2024-05-15 PROCEDURE — 1090000002 HH PPS REVENUE DEBIT

## 2024-05-15 PROCEDURE — 1090000001 HH PPS REVENUE CREDIT

## 2024-05-15 PROCEDURE — G0151 HHCP-SERV OF PT,EA 15 MIN: HCPCS | Mod: HHH

## 2024-05-15 SDOH — HEALTH STABILITY: PHYSICAL HEALTH
EXERCISE COMMENTS: PT INSTRUCTED PATIENT IN EXERCISES PER TKA PROTOCOL 1X10:  - ANKLE PUMPS  - GLUT SETS  - QUAD SETS  - HEELSLIDES  - HIP ABDUCTION  - SAQ  - LAQ  - SLR    - SEATED KNEE FLEXION STRETCH  - SEATED HIP FLEXION

## 2024-05-15 ASSESSMENT — ENCOUNTER SYMPTOMS
PAIN: 1
LOWEST PAIN SEVERITY IN PAST 24 HOURS: 0/10
PERSON REPORTING PAIN: PATIENT
HIGHEST PAIN SEVERITY IN PAST 24 HOURS: 8/10

## 2024-05-16 PROCEDURE — 1090000001 HH PPS REVENUE CREDIT

## 2024-05-16 PROCEDURE — 1090000002 HH PPS REVENUE DEBIT

## 2024-05-17 PROCEDURE — 1090000001 HH PPS REVENUE CREDIT

## 2024-05-17 PROCEDURE — 1090000002 HH PPS REVENUE DEBIT

## 2024-05-18 PROCEDURE — 1090000002 HH PPS REVENUE DEBIT

## 2024-05-18 PROCEDURE — 1090000001 HH PPS REVENUE CREDIT

## 2024-05-19 PROCEDURE — 1090000001 HH PPS REVENUE CREDIT

## 2024-05-19 PROCEDURE — 1090000002 HH PPS REVENUE DEBIT

## 2024-05-20 ENCOUNTER — HOME CARE VISIT (OUTPATIENT)
Dept: HOME HEALTH SERVICES | Facility: HOME HEALTH | Age: 79
End: 2024-05-20
Payer: MEDICARE

## 2024-05-20 PROCEDURE — 1090000001 HH PPS REVENUE CREDIT

## 2024-05-20 PROCEDURE — G0151 HHCP-SERV OF PT,EA 15 MIN: HCPCS | Mod: HHH

## 2024-05-20 PROCEDURE — 1090000002 HH PPS REVENUE DEBIT

## 2024-05-20 SDOH — HEALTH STABILITY: PHYSICAL HEALTH
EXERCISE COMMENTS: PT INSTRUCTED PATIENT IN EXERCISES PER TKA PROTOCOL 1X15:  - ANKLE PUMPS  - GLUT SETS  - QUAD SETS  - HEELSLIDES  - HIP ABDUCTION  - SAQ  - LAQ  - SLR  - SEATED KNEE FLEXION STRETCH

## 2024-05-20 ASSESSMENT — ENCOUNTER SYMPTOMS
LOWEST PAIN SEVERITY IN PAST 24 HOURS: 0/10
HIGHEST PAIN SEVERITY IN PAST 24 HOURS: 7/10
PERSON REPORTING PAIN: PATIENT
PAIN: 1

## 2024-05-21 PROCEDURE — 1090000002 HH PPS REVENUE DEBIT

## 2024-05-21 PROCEDURE — 1090000001 HH PPS REVENUE CREDIT

## 2024-05-22 ENCOUNTER — HOME CARE VISIT (OUTPATIENT)
Dept: HOME HEALTH SERVICES | Facility: HOME HEALTH | Age: 79
End: 2024-05-22
Payer: MEDICARE

## 2024-05-22 VITALS
DIASTOLIC BLOOD PRESSURE: 56 MMHG | HEART RATE: 66 BPM | RESPIRATION RATE: 16 BRPM | OXYGEN SATURATION: 98 % | TEMPERATURE: 98.9 F | SYSTOLIC BLOOD PRESSURE: 114 MMHG

## 2024-05-22 PROCEDURE — G0151 HHCP-SERV OF PT,EA 15 MIN: HCPCS | Mod: HHH

## 2024-05-22 PROCEDURE — 1090000002 HH PPS REVENUE DEBIT

## 2024-05-22 PROCEDURE — 1090000001 HH PPS REVENUE CREDIT

## 2024-05-22 SDOH — HEALTH STABILITY: PHYSICAL HEALTH: EXERCISE COMMENTS: UE TO PERFORM HEP 3X DAILY IN ORDER TO MAXIMIZE ROM INTO FLEXION AND EXTENSION.

## 2024-05-22 SDOH — HEALTH STABILITY: PHYSICAL HEALTH
EXERCISE COMMENTS: ON STRETCH  - SEATED KNEE EXTENSION STRETCH WITH THERABAND IN LONG SITTING  - STANDING KNEE EXTENSION  - STANDING HIP FLEXION  - STANDING PF/DF    PATIENT PERFORMED STANDING EXERCISES 1X10.  PATIENT TOLERATED THEM WELL.  PT EDUCATED PATIENT TO CONTIN

## 2024-05-22 SDOH — HEALTH STABILITY: PHYSICAL HEALTH
EXERCISE COMMENTS: PT INSTRUCTED PATIENT IN EXERCISES PER TKA PROTOCOL 1X15 AND UPGRADED BY ADDING STANDING R LE EXERCISES:  - ANKLE PUMPS  - GLUT SETS  - QUAD SETS  - HEELSLIDES WITH PASSIVE STRETCH USING BAND  - HIP ABDUCTION  - SAQ  - LAQ  - SLR  - SEATED KNEE FLEXI

## 2024-05-22 ASSESSMENT — ENCOUNTER SYMPTOMS
PERSON REPORTING PAIN: PATIENT
PAIN: 1

## 2024-05-23 PROCEDURE — 1090000002 HH PPS REVENUE DEBIT

## 2024-05-23 PROCEDURE — 1090000001 HH PPS REVENUE CREDIT

## 2024-05-24 PROCEDURE — 1090000001 HH PPS REVENUE CREDIT

## 2024-05-24 PROCEDURE — 1090000002 HH PPS REVENUE DEBIT

## 2024-05-25 PROCEDURE — 1090000001 HH PPS REVENUE CREDIT

## 2024-05-25 PROCEDURE — 1090000002 HH PPS REVENUE DEBIT

## 2024-05-26 PROCEDURE — 1090000001 HH PPS REVENUE CREDIT

## 2024-05-26 PROCEDURE — 1090000002 HH PPS REVENUE DEBIT

## 2024-05-27 ENCOUNTER — HOME CARE VISIT (OUTPATIENT)
Dept: HOME HEALTH SERVICES | Facility: HOME HEALTH | Age: 79
End: 2024-05-27
Payer: MEDICARE

## 2024-05-27 VITALS
DIASTOLIC BLOOD PRESSURE: 60 MMHG | OXYGEN SATURATION: 98 % | SYSTOLIC BLOOD PRESSURE: 128 MMHG | RESPIRATION RATE: 16 BRPM | TEMPERATURE: 98.1 F | HEART RATE: 80 BPM

## 2024-05-27 PROCEDURE — G0151 HHCP-SERV OF PT,EA 15 MIN: HCPCS | Mod: HHH

## 2024-05-27 PROCEDURE — 1090000002 HH PPS REVENUE DEBIT

## 2024-05-27 PROCEDURE — 1090000001 HH PPS REVENUE CREDIT

## 2024-05-27 SDOH — HEALTH STABILITY: PHYSICAL HEALTH: EXERCISE COMMENTS: ING KNEE FLEXION  - STANDING ALTERNATING HIP FLEXION  - STANDING PF/DF

## 2024-05-27 SDOH — HEALTH STABILITY: PHYSICAL HEALTH
EXERCISE COMMENTS: PT INSTRUCTED PATIENT IN EXERCISES PER TKA PROTOCOL 1X10:  - ANKLE PUMPS  - GLUT SETS  - QUAD SETS  - HEELSLIDES  - HIP ABDUCTION  - SAQ  - LAQ  - SLR  - SEATED KNEE FLEXION STRETCH  - SEATED KNEE EXTENSION STRETCH  - STANDING KNEE EXTENSION  - STAND

## 2024-05-27 ASSESSMENT — ENCOUNTER SYMPTOMS
HIGHEST PAIN SEVERITY IN PAST 24 HOURS: 7/10
PAIN: 1
PERSON REPORTING PAIN: PATIENT
LOWEST PAIN SEVERITY IN PAST 24 HOURS: 0/10

## 2024-05-28 PROCEDURE — 1090000001 HH PPS REVENUE CREDIT

## 2024-05-28 PROCEDURE — 1090000002 HH PPS REVENUE DEBIT

## 2024-05-29 PROCEDURE — 1090000002 HH PPS REVENUE DEBIT

## 2024-05-29 PROCEDURE — 1090000001 HH PPS REVENUE CREDIT

## 2024-05-30 ENCOUNTER — HOME CARE VISIT (OUTPATIENT)
Dept: HOME HEALTH SERVICES | Facility: HOME HEALTH | Age: 79
End: 2024-05-30
Payer: MEDICARE

## 2024-05-30 VITALS
SYSTOLIC BLOOD PRESSURE: 140 MMHG | RESPIRATION RATE: 18 BRPM | DIASTOLIC BLOOD PRESSURE: 58 MMHG | TEMPERATURE: 98.7 F | HEART RATE: 70 BPM | OXYGEN SATURATION: 97 %

## 2024-05-30 PROCEDURE — 1090000002 HH PPS REVENUE DEBIT

## 2024-05-30 PROCEDURE — 1090000001 HH PPS REVENUE CREDIT

## 2024-05-30 PROCEDURE — G0151 HHCP-SERV OF PT,EA 15 MIN: HCPCS | Mod: HHH

## 2024-05-30 ASSESSMENT — ENCOUNTER SYMPTOMS
PERSON REPORTING PAIN: PATIENT
PAIN: 1

## 2024-05-31 PROCEDURE — 1090000001 HH PPS REVENUE CREDIT

## 2024-05-31 PROCEDURE — 1090000002 HH PPS REVENUE DEBIT

## 2024-05-31 SDOH — HEALTH STABILITY: PHYSICAL HEALTH
EXERCISE COMMENTS: PT INSTRUCTED PATIENT IN EXERCISES PER TKA PROTOCOL 1X15:  - ANKLE PUMPS  - GLUT SETS  - QUAD SETS  - HEELSLIDES  - HIP ABDUCTION  - SAQ  - LAQ  - SLR  - SEATED KNEE FLEXION STRETCH  - SEATED KNEE EXTENSION STRETCH  - STANDING KNEE EXTENSION  - STAND

## 2024-05-31 SDOH — HEALTH STABILITY: PHYSICAL HEALTH: EXERCISE COMMENTS: ING KNEE FLEXION  - STANDING ALTERNATING HIP FLEXION  - STANDING PF/DF

## 2024-05-31 ASSESSMENT — ACTIVITIES OF DAILY LIVING (ADL)
HOME_HEALTH_OASIS: 00
OASIS_M1830: 01

## 2024-06-01 PROCEDURE — 1090000002 HH PPS REVENUE DEBIT

## 2024-06-01 PROCEDURE — 1090000001 HH PPS REVENUE CREDIT

## 2024-06-02 PROCEDURE — 1090000002 HH PPS REVENUE DEBIT

## 2024-06-02 PROCEDURE — 1090000001 HH PPS REVENUE CREDIT

## 2024-06-03 ENCOUNTER — EVALUATION (OUTPATIENT)
Dept: PHYSICAL THERAPY | Facility: CLINIC | Age: 79
End: 2024-06-03
Payer: MEDICARE

## 2024-06-03 DIAGNOSIS — M17.11 UNILATERAL PRIMARY OSTEOARTHRITIS, RIGHT KNEE: ICD-10-CM

## 2024-06-03 PROCEDURE — 97110 THERAPEUTIC EXERCISES: CPT | Mod: GP

## 2024-06-03 PROCEDURE — 97162 PT EVAL MOD COMPLEX 30 MIN: CPT | Mod: GP

## 2024-06-03 NOTE — PROGRESS NOTES
Physical Therapy Evaluation    Patient Name: Víctor Madison  MRN: [id number]  Evaluation Date: 6/3/2024  Time Calculation  Start Time: 1430  Stop Time: 1540  Time Calculation (min): 70 min  PT Evaluation Time Entry  PT Evaluation (Moderate) Time Entry: 35     PT Therapeutic Procedures Time Entry  Therapeutic Exercise Time Entry: 20      Problem List Items Addressed This Visit             ICD-10-CM    Unilateral primary osteoarthritis, right knee M17.11    Relevant Orders    Follow Up In Physical Therapy       Subjective   General:  General  Reason for Referral: R TKA 5/6/24  Referred By: Dr Trotter    Patient reported hx of injury:   Had R TKA and home same day.   Pt began HHPT until 5/30/24.  Pt saw PA about 2 weeks ago.  Pt was referred to OPPT.  Pt feels his knee has stiffened up in past 3 days. Pt has weaned off meds.        Current HEP: walk 15 min, seated:MIP, LAQ, knee flex, hasmtring stretch  Supine: AP, QS, GS, SAQs, SLR, heel slides, hip abd  Standing: B HR, MIP, hamstring curls, lunge into knee flex and hamstring stretch on stair.    Surgery:   R TKA 5/6/24    Precautions:   Above surgery    Relevant PMH:  OA, hypothyroidism    Red flags: none    Pain:   Pain description: intermittent pain in R knee.  Pt c/o stiffness in R knee.   Pt has difficulty sleeping at night    Pain at present: 0/10  Maximum pain: 2/10  Minimum pain: 0/10    Pain improves with: meds, ice   Pain worsens with: bending knee, first thing in morning, reciprocal stair climbing, standing > 5 min, getting in/out car    Home Living:       Home type: House with 13 steps between floors (mostly railed)  and 1 or 3 steps to enter.  Lives with: Spouse  Occupation: retired  Hobbies/activities: cross fit, yardwork, ping pong and walking    Prior Function Per Pt/Caregiver Report:  Level of [place]: Independent with ADLs and functional transfers    Objective   Posture:   Ant head with min forward rounded shoulders    Range of Motion:   B hip  and knee AROM is wnl.  L knee AROM is wnl.     Knee AROM R   Flexion 7-102 deg   Extension -7 deg         Strength:     LE MMT L R   Hip flexion 4+/5 3/5   Hip abduction 4+/5 3/5   Hip adduction 4+/5 3+/5   Knee flexion 4+/5 4-/5   Knee extension 4+/5 3+/5   Ankle DF 4+/5 4-/5      Flexibility:   Tight R hamstrings    Gait:   Pt walks with straight cane with 3 point step through pattern with decreased heel strike on R vs L    Transfers:   Sit to stand with 1 UE assist    Other:   Incision well approximated with scabbed areas present.  Pt has no drainage or open areas.    Outcome Measures:  WOMAC reveals 40/84     Assessment  Rehab Prognosis: Good  Evaluation/Treatment Tolerance: Patient tolerated treatment well    Pt is a 78 y.o. male who presents with R knee pain, decreased ROM and strength s/p R TKR on 5/6/24. These impairments have led to functional limitations including standing, stairs and walking without device. Pt would benefit from skilled physical therapy intervention to improve above impairments and facilitate return to function.    Plan  Treatment/Interventions: Education/ Instruction, Cryotherapy, Gait training, Manual therapy, Neuromuscular re-education, Therapeutic activities, Therapeutic exercises  PT Plan: Skilled PT  PT Frequency: 2 times per week  Duration: Up to 20 visits; reassessment to be done at 10th visit  Onset Date: 05/06/24  Certification Period Start Date: 06/03/24  Certification Period End Date: 09/01/24  Number of Treatments Authorized: MEDICARE A&B, MMO [place]PP, [place], NO AUTH ( $0 USED PT/ST)  Rehab Potential: Good  Plan of Care Agreement: Patient    Plan for next visit: Progress exs.  Manual as indicated    OP EDUCATION:  Outpatient Education  Individual(s) Educated: Patient, Spouse  Education Provided: Anatomy, Body Mechanics, Home Exercise Program, Physiology, POC, Post-Op Precautions  Risk and Benefits Discussed with Patient/Caregiver/Other: yes  Patient/Caregiver Demonstrated  "Understanding: yes  Plan of Care Discussed and Agreed Upon: yes  Patient Response to Education: Patient/Caregiver Verbalized Understanding of Information, Patient/Caregiver Performed Return Demonstration of Exercises/Activities, Patient/Caregiver Asked Appropriate Questions    Today's Treatment:  Therapeutic Exercise  HEP to be completed daily, exercises include:  TKA exs as noted above.   PT reviewed POC and answered pt questions on ice, meds, positioning of LE, exs, lying down after exs.  Pt concerned with episodes of feeling cold at times after exercising.  Encouraged pt to exercise after eating vs on empty stomach.   Quad sets 5\"x15 with heel propped.  Knee ext improved to -5 degrees  Heel slides AROM both supine and seated  AAROM heel slides with sheet for OP in supine  Discussed pt performing seated heel slides or LAQs when seated to help reduce stiffness.   Pt able to demonstrate all HEP correctly.    Goals:  Active       PT Problem : R TKA       PT STG       Start:  06/03/24    Expected End:  08/02/24       Pt will be demonstrate increased R knee AROM 0-110 deg so pt can move sit to stand from lower surfaces without UE assistance.  Pt will be able to stand > 10 min with 5-% less pain.         PT Goal 2       Start:  06/03/24    Expected End:  09/01/24       Pt will demonstrate increased strength in R LE by > 1/2 MMT grade so pt can ascend stairs reciprocally with 1 rail.  Pt will be independent with HEP to allow pt to get in / out of car and on/ off tractor without difficulty.  Pt will resume functional activities and be able to walk in community safely for > 30 minutes without LOB.            Patient Stated Goal 1       Start:  06/03/24    Expected End:  09/01/24       Pt will be able to walk and climb stairs without pain.                       "

## 2024-06-06 ENCOUNTER — TREATMENT (OUTPATIENT)
Dept: PHYSICAL THERAPY | Facility: CLINIC | Age: 79
End: 2024-06-06
Payer: MEDICARE

## 2024-06-06 DIAGNOSIS — M17.11 UNILATERAL PRIMARY OSTEOARTHRITIS, RIGHT KNEE: ICD-10-CM

## 2024-06-06 PROCEDURE — 97110 THERAPEUTIC EXERCISES: CPT | Mod: GP

## 2024-06-06 NOTE — PROGRESS NOTES
"    Physical Therapy Treatment    Patient Name: Víctor Madison  MRN: [id number]  Encounter date:  6/6/2024  Time Calculation  Start Time: 0916  Stop Time: 1001  Time Calculation (min): 45 min     PT Therapeutic Procedures Time Entry  Manual Therapy Time Entry: 4  Therapeutic Exercise Time Entry: 40    Visit Number:  2 (including evaluation)  Planned total visits: reassess visit 10, POC 20  Visits Authorized/Insurance Coverage:  MEDICARE A&B, MMO [place]PP, [place], [place] AUTH ( $0 USED PT/ST    Current Problem  Problem List Items Addressed This Visit             ICD-10-CM    Unilateral primary osteoarthritis, right knee M17.11       Surgery   R TKA 5/6/24    Precautions   Above surgery    Pain   1-2/10    Subjective  General   Pt reports his  R knee is stiff.  Pt's wife present for treatment.         Objective  Pt ambulating with straight cane.  R knee flexed during gait with minimal heel strike on R      Treatment:  THERAPEUTIC EXS:  Nustep L1 for 5 min    Standing at wall ladder:  B heel raises 10x  Unilateral heel raises 5x each LE with B UE support  Moflex gastroc stretch 20\"x4  Hamstring curls standing 12x R  MIP 12x    Seated:  LAQs 12x R    Supine on treatment table:  Heel slides AROM 2x10  AAROM heel slides with strap for OP in supine 5\"x5  SLR R 10x  SAQs R 10x, then 1.5# 10x  Quad sets 5\"x15 with small roll under knee- pt has difficulty recruiting quads but does better after SAQs and with towel roll  Quad set 10x 5\" without  towel roll.      Current HEP:  Current HEP from PT: walk 15 min, seated:MIP, LAQ, knee flex, hasmtring stretch  Supine: AP, QS, GS, SAQs, SLR, heel slides, hip abd  Standing: B HR, MIP, hamstring curls, lunge into knee flex and hamstring stretch on stair.    Activity tolerance:  improving    OP EDUCATION:   Rationale for exs, anatomy    Assessment:     Pt's response to treatment:  R knee AROM improved to 3-110 degrees at most with AAROM.  Quad set improving  Areas of improvements:  " ROM  Limitations/deficits:  quad weakness    Pain end of session: 2/10    Plan:     Continue with current POC/no changes    Assessment of current progress against goals:  Progressing toward functional goals    Goals:  Active       PT Problem : R TKA       PT STG       Start:  06/03/24    Expected End:  08/02/24       Pt will be demonstrate increased R knee AROM 0-110 deg so pt can move sit to stand from lower surfaces without UE assistance.  Pt will be able to stand > 10 min with 5-% less pain.         PT Goal 2       Start:  06/03/24    Expected End:  09/01/24       Pt will demonstrate increased strength in R LE by > 1/2 MMT grade so pt can ascend stairs reciprocally with 1 rail.  Pt will be independent with HEP to allow pt to get in / out of car and on/ off tractor without difficulty.  Pt will resume functional activities and be able to walk in community safely for > 30 minutes without LOB.            Patient Stated Goal 1       Start:  06/03/24    Expected End:  09/01/24       Pt will be able to walk and climb stairs without pain.

## 2024-06-10 LAB
ATRIAL RATE: 50 BPM
P AXIS: 64 DEGREES
P OFFSET: 179 MS
P ONSET: 127 MS
PR INTERVAL: 176 MS
Q ONSET: 215 MS
QRS COUNT: 9 BEATS
QRS DURATION: 98 MS
QT INTERVAL: 448 MS
QTC CALCULATION(BAZETT): 408 MS
QTC FREDERICIA: 421 MS
R AXIS: 61 DEGREES
T AXIS: 43 DEGREES
T OFFSET: 439 MS
VENTRICULAR RATE: 50 BPM

## 2024-06-11 ENCOUNTER — TREATMENT (OUTPATIENT)
Dept: PHYSICAL THERAPY | Facility: CLINIC | Age: 79
End: 2024-06-11
Payer: MEDICARE

## 2024-06-11 DIAGNOSIS — M17.11 UNILATERAL PRIMARY OSTEOARTHRITIS, RIGHT KNEE: ICD-10-CM

## 2024-06-11 PROCEDURE — 97110 THERAPEUTIC EXERCISES: CPT | Mod: GP

## 2024-06-11 NOTE — PROGRESS NOTES
"    Physical Therapy Treatment    Patient Name: Víctor Madison  MRN: [id number]  Encounter date:  6/11/2024  Time Calculation  Start Time: 0904  Stop Time: 0947  Time Calculation (min): 43 min     PT Therapeutic Procedures Time Entry  Therapeutic Exercise Time Entry: 41    Visit Number:  3 (including evaluation)  Planned total visits: reassess visit 10, POC 20  Visits Authorized/Insurance Coverage:  MEDICARE A&B, MMO SUPP, [place], [place] AUTH ( $0 USED PT/ST    Current Problem  Problem List Items Addressed This Visit             ICD-10-CM    Unilateral primary osteoarthritis, right knee M17.11         Surgery   R TKA 5/6/24    Precautions   Above surgery    Pain   0/10    Subjective  General   Pt reports he is having trouble sleeping due to knee pain.  (2/10)  PT recommended pt contact physician to ask what to take.      Objective  -3 deg knee ext at best      Treatment:  THERAPEUTIC EXS:  Nustep L1 for 6 min (seat at 9)    Standing at wall ladder:  B heel raises 12x  Unilateral heel raises 10x R>L with B UE support  Moflex gastroc stretch 20\"x3  Hamstring curls standing 12x R  R foot tap 4\" 12x  Ant step ups 4\" 10x R, then 6\" R 10x  Lunge stretch for knee flex on step- pt does this at home    Supine on treatment table:  Heel slides AROM 2x10  AAROM heel slides with strap for OP in supine 10\"x3  SLR R 10x  Quad sets 5\"x10    Patellar mobs by PT    Current HEP:  Current HEP from HHPT: walk 15 min, seated:MIP, LAQ, knee flex, hasmtring stretch  Supine: AP, QS, GS, SAQs, SLR, heel slides, hip abd  Standing: B HR, MIP, hamstring curls, lunge into knee flex and hamstring stretch on stair.    Activity tolerance:  improving    OP EDUCATION:   Rationale for exs, anatomy    Assessment:     Pt's response to treatment:  Pt able to perform ant step ups on 6\" step with 1 rail without increased knee pain  Areas of improvements:  ROM, less swelling  Limitations/deficits:  soreness    Pain end of session: 1-2/10    Plan:     Continue with " current POC/no changes    Assessment of current progress against goals:  Progressing toward functional goals    Goals:  Active       PT Problem : R TKA       PT STG       Start:  06/03/24    Expected End:  08/02/24       Pt will be demonstrate increased R knee AROM 0-110 deg so pt can move sit to stand from lower surfaces without UE assistance.  Pt will be able to stand > 10 min with 5-% less pain.         PT Goal 2       Start:  06/03/24    Expected End:  09/01/24       Pt will demonstrate increased strength in R LE by > 1/2 MMT grade so pt can ascend stairs reciprocally with 1 rail.  Pt will be independent with HEP to allow pt to get in / out of car and on/ off tractor without difficulty.  Pt will resume functional activities and be able to walk in community safely for > 30 minutes without LOB.            Patient Stated Goal 1       Start:  06/03/24    Expected End:  09/01/24       Pt will be able to walk and climb stairs without pain.

## 2024-06-13 ENCOUNTER — APPOINTMENT (OUTPATIENT)
Dept: PHYSICAL THERAPY | Facility: CLINIC | Age: 79
End: 2024-06-13
Payer: MEDICARE

## 2024-06-13 ENCOUNTER — APPOINTMENT (OUTPATIENT)
Dept: PRIMARY CARE | Facility: CLINIC | Age: 79
End: 2024-06-13
Payer: MEDICARE

## 2024-06-13 NOTE — PROGRESS NOTES
"    Physical Therapy Treatment    Patient Name: Víctor Madison  MRN: [id number]  Encounter date:  6/14/2024  Time Calculation  Start Time: 1300  Stop Time: 1345  Time Calculation (min): 45 min     PT Therapeutic Procedures Time Entry  Therapeutic Exercise Time Entry: 40    Visit Number:  4 (including evaluation)  Planned total visits: re-assess visit 10/total of 20 visits  Visits Authorized/Insurance Coverage:  MEDICARE A&B, [place], [place], [place] ( $0 USED PT/ST)    Current Problem  Problem List Items Addressed This Visit             ICD-10-CM    Unilateral primary osteoarthritis, right knee - Primary M17.11       Surgery   R TKA 5/6/24     Precautions  Above surgery       Pain  0/10    Subjective  General  Patient reports diligent compliant with HEP.  Patient daughter present for visit.    Objective  PROM right knee flexion 108  Gait smooth and fluid with and without cane      Treatment:  THERAPEUTIC EXS:  Nustep L2 for 6 min (seat at 9)     Standing at wall ladder:  B heel raises x 15  Unilateral heel raises 10x R>L with B UE support- DNP  Moflex gastroc stretch 20\"x3  Hamstring curls standing x 15 bilateral  Bilateral ABD x 15 bilateral  Squats to ball x 15  Ant step ups 6\" x 15  Lunge stretch for knee flex on step- pt does this at home- DNP     Supine on treatment table:  Heel slides AROM 2x10  AAROM heel slides with strap for OP in supine 10\"x3  SLR R 15 x  Quad sets 5\"x30  Patellar mobs by PT- DNP     Current HEP:  Current HEP from HHPT: walk 15 min, seated:MIP, LAQ, knee flex, hasmtring stretch  Supine: AP, QS, GS, SAQs, SLR, heel slides, hip abd  Standing: B HR, MIP, hamstring curls, lunge into knee flex and hamstring stretch on stair.      Activity tolerance:  good    OP EDUCATION:  HEP    Assessment:     Pt's response to treatment:  good  Areas of improvements: gait quality  Limitations/deficits:  endurance    Pain end of session:   0/10    Plan:     Continue with current POC with the following changes " advance aas ablw    Assessment of current progress against goals:  Progressing toward functional goals    Goals:  Active       PT Problem : R TKA       PT STG       Start:  06/03/24    Expected End:  08/02/24       Pt will be demonstrate increased R knee AROM 0-110 deg so pt can move sit to stand from lower surfaces without UE assistance.  Pt will be able to stand > 10 min with 5-% less pain.         PT Goal 2       Start:  06/03/24    Expected End:  09/01/24       Pt will demonstrate increased strength in R LE by > 1/2 MMT grade so pt can ascend stairs reciprocally with 1 rail.  Pt will be independent with HEP to allow pt to get in / out of car and on/ off tractor without difficulty.  Pt will resume functional activities and be able to walk in community safely for > 30 minutes without LOB.            Patient Stated Goal 1       Start:  06/03/24    Expected End:  09/01/24       Pt will be able to walk and climb stairs without pain.

## 2024-06-14 ENCOUNTER — TREATMENT (OUTPATIENT)
Dept: PHYSICAL THERAPY | Facility: CLINIC | Age: 79
End: 2024-06-14
Payer: MEDICARE

## 2024-06-14 DIAGNOSIS — M17.11 UNILATERAL PRIMARY OSTEOARTHRITIS, RIGHT KNEE: Primary | ICD-10-CM

## 2024-06-14 PROCEDURE — 97110 THERAPEUTIC EXERCISES: CPT | Mod: GP

## 2024-06-17 NOTE — PROGRESS NOTES
"    Physical Therapy Treatment    Patient Name: Víctor Madison  MRN: [id number]  Encounter date:  6/18/2024   Time Calculation  Start Time: 1400  Stop Time: 1445  Time Calculation (min): 45 min  PT Therapeutic Procedures Time Entry  Therapeutic Exercise Time Entry: 40     Visit Number:  5 (including evaluation)  Planned total visits: re-assess visit 10/total of 20 visits  Visits Authorized/Insurance Coverage:  MEDICARE A&B, [employer], [place], [place] ( $0 USED PT/ST)    Current Problem  Problem List Items Addressed This Visit             ICD-10-CM    Unilateral primary osteoarthritis, right knee M17.11       Surgery   R TKA 5/6/24     Precautions  Above surgery    Pain    0/10     Subjective  General      Patient reports he had his 6 week follow up visit and was told he is progressing very well, \"ahead of the curve.\" Patient reports he has been compliant with his HEP, performing them 3x/ day.     Objective      PROM R knee: 0-116 deg in supine.    Treatment:  THERAPEUTIC EXS:  Nustep L2 for 6 min (seat at 7)  BLE only     Standing at hand rail:  B heel raises x 15  Unilateral heel raises 10x R>L with B UE support- DNP  Moflex gastroc stretch 20\" x 3  Hamstring curls standing x 15 bilateral  Bilateral ABD x 15 L/R  Squats to ball x 15  Ant step ups, 8\" x 15 L/R  Side step ups, 8\" x 10 L/R  Lunge stretch for knee flex on step- pt does this at home- x 15, 5\" hold     Supine on treatment table:.  Heel slides AROM 2x10,   AAROM heel slides with strap for OP in supine 10\"x3  SLR R 10 x 2   Quad sets 5\"x30    Patellar mobs by PT- DNP 6/18     Current HEP:  Current HEP from PT: walk 15 min, seated:MIP, LAQ, knee flex, hasmtring stretch  Supine: AP, QS, GS, SAQs, SLR, heel slides, hip abd  Standing: B HR, MIP, hamstring curls, lunge into knee flex and hamstring stretch on stair.    Activity tolerance:  Good    OP EDUCATION:      Assessment:      Patient continues to improve his ability to move his RIGHT knee through AROM with " ADLs and therex.  AROM on heel slides more difficult in supine than sitting. Added resistance to SLR, added lateral steps, increased step height on anterior step ups, good response. Denied increase in R knee pain following therex.     Pt's response to treatment:  Good  Areas of improvements: Improved R knee ROM, and ability to step on to steps  Limitations/deficits: Remaining R knee swelling, limiting flexion    Pain end of session: 0/10    Plan:    Continue with current POC/no changes    Assessment of current progress against goals:  Progressing toward functional goals    Goals:    Active         PT Problem : R TKA         PT STG         Start:  06/03/24    Expected End:  08/02/24        Pt will be demonstrate increased R knee AROM 0-110 deg so pt can move sit to stand from lower surfaces without UE assistance.  Pt will be able to stand > 10 min with 5-% less pain.           PT Goal 2         Start:  06/03/24    Expected End:  09/01/24        Pt will demonstrate increased strength in R LE by > 1/2 MMT grade so pt can ascend stairs reciprocally with 1 rail.  Pt will be independent with HEP to allow pt to get in / out of car and on/ off tractor without difficulty.  Pt will resume functional activities and be able to walk in community safely for > 30 minutes without LOB.               Patient Stated Goal 1         Start:  06/03/24    Expected End:  09/01/24        Pt will be able to walk and climb stairs without pain.

## 2024-06-18 ENCOUNTER — TREATMENT (OUTPATIENT)
Dept: PHYSICAL THERAPY | Facility: CLINIC | Age: 79
End: 2024-06-18
Payer: MEDICARE

## 2024-06-18 DIAGNOSIS — M17.11 UNILATERAL PRIMARY OSTEOARTHRITIS, RIGHT KNEE: ICD-10-CM

## 2024-06-18 PROCEDURE — 97110 THERAPEUTIC EXERCISES: CPT | Mod: CQ,GP | Performed by: SPECIALIST/TECHNOLOGIST

## 2024-06-20 ENCOUNTER — TREATMENT (OUTPATIENT)
Dept: PHYSICAL THERAPY | Facility: CLINIC | Age: 79
End: 2024-06-20
Payer: MEDICARE

## 2024-06-20 DIAGNOSIS — M17.11 UNILATERAL PRIMARY OSTEOARTHRITIS, RIGHT KNEE: ICD-10-CM

## 2024-06-20 PROCEDURE — 97110 THERAPEUTIC EXERCISES: CPT | Mod: GP

## 2024-06-20 NOTE — PROGRESS NOTES
"    Physical Therapy Treatment    Patient Name: Víctor Madison  MRN: [id number]  Encounter date:  6/20/2024  Time Calculation  Start Time: 0916  Stop Time: 1000  Time Calculation (min): 44 min     PT Therapeutic Procedures Time Entry  Therapeutic Exercise Time Entry: 43    Visit Number:  6 (including evaluation)  Planned total visits: reassess visit 10, POC 20   Visits Authorized/Insurance Coverage:  MEDICARE A&B, [employer], [place] ( $0 USED PT/ST)    Current Problem  Problem List Items Addressed This Visit             ICD-10-CM    Unilateral primary osteoarthritis, right knee M17.11       Surgery  R TKA 5/6/24     Precautions   Above surgery    Pain   1/10    Subjective  General   Pt reports he is had more trouble sleeping last night.  Pt walked a mile in his house yesterday.  Pt continues TKR HEP 3x/day.  Pt still doing steps one at time.      Objective  Quad set with glut set still.  Needed cues to perform correctly    Treatment:  THERAPEUTIC EXS:  Nustep L2 for 7 min (seat at 7)  BLE only- PT encouraged to try more walking out of the house to build endurance.       Standing at hand rail:  B heel raises x 15  Unilateral heel raises 10x2 R/L with B UE support  standing gastroc stretch 20\" x 3 R/L    Hamstring curls standing R x 20, Lx10 then R slow eccentric 10x with 1# weight  SLR with 1# 10x    Ant step ups, 6\" x 15 R  R Ant step up 6\" with march L knee 10x  R lateral step ups 6\" x 10   Lunge stretch for knee flex on step- pt does this at home- discussed difference vs gastroc stretch    Forward step downs 4\" x10 R  Ant step downs R 4\"5x     Supine on treatment table:.  Heel slides AROM 2x10,   AAROM heel slides with strap for OP in supine 10\"x3- DNP  Quad sets 5\" 30x with cues and education from PT on correct performance and rationale for this.    Pt and his wife concerned with HEP and what exs are necessary.  Patellar mobs by PT     Current HEP:  Current HEP from HHPT: walk 15 min, seated:MIP, LAQ, knee " flex, hamstring stretch  Supine: AP, QS, GS, SAQs, SLR, heel slides, hip abd  Standing: B HR, MIP, hamstring curls, lunge into knee flex and hamstring stretch on stair.        Activity tolerance:  Good.  R quad fatigues with exs    OP EDUCATION:   Rationale for quad set and HEP    Assessment:     Pt's response to treatment:  R LE strength is gradually improving.  Areas of improvements:  quad set   Limitations/deficits:  weakness    Pain end of session: 1/10    Plan:     Continue with current POC/no changes    Assessment of current progress against goals:  Progressing toward functional goals    Goals:  Active       PT Problem : R TKA       PT STG       Start:  06/03/24    Expected End:  08/02/24       Pt will be demonstrate increased R knee AROM 0-110 deg so pt can move sit to stand from lower surfaces without UE assistance.  Pt will be able to stand > 10 min with 5-% less pain.         PT Goal 2       Start:  06/03/24    Expected End:  09/01/24       Pt will demonstrate increased strength in R LE by > 1/2 MMT grade so pt can ascend stairs reciprocally with 1 rail.  Pt will be independent with HEP to allow pt to get in / out of car and on/ off tractor without difficulty.  Pt will resume functional activities and be able to walk in community safely for > 30 minutes without LOB.            Patient Stated Goal 1       Start:  06/03/24    Expected End:  09/01/24       Pt will be able to walk and climb stairs without pain.

## 2024-06-24 ENCOUNTER — TREATMENT (OUTPATIENT)
Dept: PHYSICAL THERAPY | Facility: CLINIC | Age: 79
End: 2024-06-24
Payer: MEDICARE

## 2024-06-24 DIAGNOSIS — M17.11 UNILATERAL PRIMARY OSTEOARTHRITIS, RIGHT KNEE: ICD-10-CM

## 2024-06-24 PROCEDURE — 97110 THERAPEUTIC EXERCISES: CPT | Mod: GP

## 2024-06-24 NOTE — PROGRESS NOTES
"    Physical Therapy Treatment    Patient Name: Víctor Madison  MRN: [id number]  Encounter date:  6/24/2024  Time Calculation  Start Time: 1000  Stop Time: 1050  Time Calculation (min): 50 min     PT Therapeutic Procedures Time Entry  Therapeutic Exercise Time Entry: 47    Visit Number:  7 (including evaluation)  Planned total visits: reassess visit 10, POC 20   Visits Authorized/Insurance Coverage:  MEDICARE A&B, [employer], [place] ( $0 USED PT/ST)    Current Problem  Problem List Items Addressed This Visit             ICD-10-CM    Unilateral primary osteoarthritis, right knee M17.11         Surgery  R TKA 5/6/24     Precautions   Above surgery    Pain   1/10    Subjective  General   Pt reports he is climbing his stairs at home reciprocally.      Objective  Small petichae noted on prox ant R knee.  No increased warmth, redness or tenderness. Pt and his wife to monitor.  Pt encouraged to wear shorts at home more as inside of workout pants are nylon.    R knee flex 0-123 AAROM     Treatment:  THERAPEUTIC EXS:  Nustep L2 for 7 min (seat at 7)  BLE only     Supine on treatment table:  Heel slides AROM 2x10  AAROM heel slides with strap for OP in supine 10\"x3  Quad sets 5\" 30x     PT, pt and pt's wife reviewed HEP in detail.  Pt brought handout from home PT.  Pt wants to know what exs he is to do as he is somewhat overwhelmed with them.     Aps  to be performed seated as heel raises/ toe raises 15x 3x/day while pt is seated  Heel slides to be performed seated 5x/day for 5-10 reps, then supine AROM x15 and AAROM with strap 10\"x3 2-3x/day  Supine hip abd to be discontinued  SLR 15x R then 15x with LE ER 15x- to be done daily  SAQ's  Quad sets 5\"x30-100 per day as pt able   Long sit hamstring stretch 20\"x3 while watching TV  Lunge on step into knee flex 5\"x10 1-2x/day  Standing B heel raises x20  Standing hasmtring curl x20  Standing MIP slowly 15x to be done 3x/week for balance    Patellar mobs by PT    DNP " "6/24/24:  Standing at hand rail:  Unilateral heel raises 10x2 R/L with B UE support  standing gastroc stretch 20\" x 3 R/L  Ant step ups, 6\" x 15 R  R Ant step up 6\" with march L knee 10x  R lateral step ups 6\" x 10   Forward step downs 4\" x10 R  Ant step downs R 4\"5x        Current HEP:  Current HEP: LAQ, long sit hamstring stretch, HR/TR seated  Supine: QS, SLR, SLR with LE ER, heel slides(seated and supine)  Standing: B HR, MIP, hamstring curls, lunge into knee flex       Activity tolerance:  Good    OP EDUCATION:   HEP    Assessment:     Pt's response to treatment:  Pt demonstrates improved quad set.  Pt needs visual feedback of looking at leg to perform correctly.   Knee flex and ext ROM improving.  Areas of improvements:  ROM, quad set control  Limitations/deficits:  descending stairs    Pain end of session: 1/10    Plan:     Continue with current POC/no changes    Assessment of current progress against goals:  Progressing toward functional goals    Goals:  Active       PT Problem : R TKA       PT STG       Start:  06/03/24    Expected End:  08/02/24       Pt will be demonstrate increased R knee AROM 0-110 deg so pt can move sit to stand from lower surfaces without UE assistance.  Pt will be able to stand > 10 min with 5-% less pain.         PT Goal 2       Start:  06/03/24    Expected End:  09/01/24       Pt will demonstrate increased strength in R LE by > 1/2 MMT grade so pt can ascend stairs reciprocally with 1 rail.  Pt will be independent with HEP to allow pt to get in / out of car and on/ off tractor without difficulty.  Pt will resume functional activities and be able to walk in community safely for > 30 minutes without LOB.            Patient Stated Goal 1       Start:  06/03/24    Expected End:  09/01/24       Pt will be able to walk and climb stairs without pain.                           "

## 2024-06-27 ENCOUNTER — TREATMENT (OUTPATIENT)
Dept: PHYSICAL THERAPY | Facility: CLINIC | Age: 79
End: 2024-06-27
Payer: MEDICARE

## 2024-06-27 DIAGNOSIS — M17.11 UNILATERAL PRIMARY OSTEOARTHRITIS, RIGHT KNEE: ICD-10-CM

## 2024-06-27 PROCEDURE — 97110 THERAPEUTIC EXERCISES: CPT | Mod: GP

## 2024-06-27 NOTE — PROGRESS NOTES
"    Physical Therapy Treatment    Patient Name: Víctor Madison  MRN: [id number]  Encounter date:  6/27/2024  Time Calculation  Start Time: 0831  Stop Time: 0915  Time Calculation (min): 44 min     PT Therapeutic Procedures Time Entry  Therapeutic Exercise Time Entry: 43    Visit Number:  8 (including evaluation)  Planned total visits: reassess visit 10, POC 20   Visits Authorized/Insurance Coverage:  [employer]ARE A&B, [employer], [place] ( $0 USED PT/ST)    Current Problem  Problem List Items Addressed This Visit             ICD-10-CM    Unilateral primary osteoarthritis, right knee M17.11         Surgery  R TKA 5/6/24     Precautions   Above surgery    Pain   0/10    Subjective  General   Pt reports he is doing better overall.   Pt      Objective  Small petichae noted on prox ant R knee although lighter than last visit.  No increased warmth, redness or tenderness. R knee flex 0-127 deg AAROM     Treatment:  THERAPEUTIC EXS:  Nustep L3 for 7 min (seat at 7)  BLE only     Supine on treatment table:  Heel slides AROM 2x10  AAROM heel slides with strap for OP in supine 10\"x3  Quad sets 5\" 30x     Standing B heel raises x20  Standing heel raises R/L 10x  Moflex stretch 20\"x3  Standing hamstring curl x20  Standing MIP slowly 15x     Ant step ups, 6\" x 15 R  R Ant step up 6\" with march L knee 10x  R lateral step ups 6\" x 10   Forward step downs 4\" x10 R  Ant step downs R 4\"5x    Ant step up on 7.5\" step      Current HEP:  Current HEP: LAQ, long sit hamstring stretch, HR/TR seated  Supine: QS, SLR, SLR with LE ER, heel slides(seated and supine)  Standing: B HR, MIP, hamstring curls, lunge into knee flex   Ant step ups      Activity tolerance:  Good    OP EDUCATION:   HEP    Assessment:     Pt's response to treatment:  Great  Areas of improvements:  ROM and strength improving, quad set control during step ups  Limitations/deficits:  descending stairs    Pain end of session: 0/10    Plan:     Continue with current POC/no " changes    Assessment of current progress against goals:  Progressing toward functional goals    Goals:  Active       PT Problem : R TKA       PT STG       Start:  06/03/24    Expected End:  08/02/24       Pt will be demonstrate increased R knee AROM 0-110 deg so pt can move sit to stand from lower surfaces without UE assistance.  Pt will be able to stand > 10 min with 5-% less pain.         PT Goal 2       Start:  06/03/24    Expected End:  09/01/24       Pt will demonstrate increased strength in R LE by > 1/2 MMT grade so pt can ascend stairs reciprocally with 1 rail.  Pt will be independent with HEP to allow pt to get in / out of car and on/ off tractor without difficulty.  Pt will resume functional activities and be able to walk in community safely for > 30 minutes without LOB.            Patient Stated Goal 1       Start:  06/03/24    Expected End:  09/01/24       Pt will be able to walk and climb stairs without pain.

## 2024-07-02 ENCOUNTER — TREATMENT (OUTPATIENT)
Dept: PHYSICAL THERAPY | Facility: CLINIC | Age: 79
End: 2024-07-02
Payer: MEDICARE

## 2024-07-02 DIAGNOSIS — M17.11 UNILATERAL PRIMARY OSTEOARTHRITIS, RIGHT KNEE: ICD-10-CM

## 2024-07-02 PROCEDURE — 97110 THERAPEUTIC EXERCISES: CPT | Mod: GP

## 2024-07-02 NOTE — PROGRESS NOTES
"    Physical Therapy Treatment/ Progress Note    Patient Name: Víctor Madison  MRN: [id number]  Encounter date:  7/2/2024  Time Calculation  Start Time: 0901  Stop Time: 0945  Time Calculation (min): 44 min     PT Therapeutic Procedures Time Entry  Therapeutic Exercise Time Entry: 43    Visit Number:  9 (including evaluation)  Planned total visits:  POC 20   Visits Authorized/Insurance Coverage:  MEDICARE A&B, [employer] [place], [place], [place] ( $0 USED PT/ST)    Current Problem  Problem List Items Addressed This Visit             ICD-10-CM    Unilateral primary osteoarthritis, right knee M17.11           Surgery  R TKA 5/6/24     Precautions   Above surgery    Pain   0/10    Subjective  General   Pt reports dr office told him to avoid using ice and Vitamin E on knee due to rash.  Pt notes knee has been a little more sore and swollen since not using ice.  Pt had to take Tylenol to sleep last night.  Pt states sleep is going a little better.  Pt was able to mow grass yesterday.     Objective  R knee slightly more swollen than last week.  Rash on prox R knee is significantly lighter.  R knee AAROM flex to 126 deg at most.  AROM R knee today 2-120 degrees    Treatment:  THERAPEUTIC EXS:  Nustep L4 for 7 min (seat at 9)  BLE only     Seated LAQs 15x R    Quad sets long sit 5\"x20- pt encouraged to hold quad set for full 5 sec count  Supine on treatment table:  Heel slides AROM 2x10  AAROM heel slides with strap for OP in supine 10\"x4    Upright bike 5 min with seat lowered at 3 minutes    Standing B heel raises x20  Standing heel raises R/L 10x  Moflex stretch 20\"x3    Ant step ups, 6\" x 15 R- DNP  R Ant step up 6\" with march L knee 10x- DNP  R lateral step downs 4\" x 10   Forward step downs 4\" x10 R  Ant step downs R 4\"5x       Current HEP:  Current HEP: LAQ, long sit hamstring stretch, HR/TR seated  Supine: QS, SLR, SLR with LE ER, heel slides(seated and supine)  Standing: B HR, MIP, hamstring curls, lunge into knee flex   Ant " step ups      Activity tolerance:  Good    Assessment:     Pt's response to treatment:  Pt frustrated as R knee flex and ext less today than last session, however pt's knee was more sore today.  Pt challenged with descending step.  Quad set strength continues to improve.  Areas of improvements:  walking  Limitations/deficits:  descending stairs    Pain end of session: 2/10    Plan:     Continue with current POC/no changes.  Continue PT 1-2x/week per POC    Assessment of current progress against goals:  Progressing toward functional goals    Goals:  Active       PT Problem : R TKA       PT STG (Met)       Start:  06/03/24    Expected End:  08/02/24    Resolved:  07/02/24    Pt will be demonstrate increased R knee AROM 0-110 deg so pt can move sit to stand from lower surfaces without UE assistance.  Pt will be able to stand > 10 min with 5-% less pain.         PT Goal 2 (Progressing)       Start:  06/03/24    Expected End:  09/01/24       Pt will demonstrate increased strength in R LE by > 1/2 MMT grade so pt can ascend stairs reciprocally with 1 rail.  Pt will be independent with HEP to allow pt to get in / out of car and on/ off tractor without difficulty.  Pt will resume functional activities and be able to walk in community safely for > 30 minutes without LOB.            Patient Stated Goal 1 (Progressing)       Start:  06/03/24    Expected End:  09/01/24       Pt will be able to walk and climb stairs without pain.

## 2024-07-04 NOTE — PROGRESS NOTES
"    Physical Therapy Treatment    Patient Name: Víctor Madison  MRN: [id number]  Encounter date:  7/5/2024  Time Calculation  Start Time: 0848  Stop Time: 0929  Time Calculation (min): 41 min  PT Therapeutic Procedures Time Entry  Therapeutic Exercise Time Entry: 40     Visit Number:  10 (including evaluation)  Planned total visits:  POC 20   Visits Authorized/Insurance Coverage:  MEDICARE A&B, [employer], [place], [place] ( $0 USED PT/ST)    Current Problem  Problem List Items Addressed This Visit             ICD-10-CM    Unilateral primary osteoarthritis, right knee M17.11     Surgery  R TKA 5/6/24      Precautions   Above surgery    Pain  0/10    Subjective  General   Patient reports he has been compliant with his HEP, rode the tractor to cut the grass, noting 1-2/10 pain form and unknown cause that has been, stating he took NSAIDs to treat. Patient had rash on medial R knee evaluated, was advised it \"was nothing to be concerned about\", but to limit ice application to 10 min at a time. Patient notes he has been using step-to vs reciprocal gait descending stairs at home.      Objective      PROM: 2-110 deg, AROM with strap: 110 deg flex. Decreased rash/redness on medial aspect of R knee. Mild R knee swelling         Treatment:  THERAPEUTIC EXS:  Nustep L4 for 8 min, 0.5 mile, (seat at 9) BLE only     Seated LAQs 15x R     Quad sets long sit 5\"x20- pt encouraged to hold quad set for full 5 sec count    Supine on treatment table:  Heel slides AROM 2x10  AAROM heel slides with strap for OP in supine 10\" x 6     Upright bike 5 min with seat lowered at 3 minutes     Standing B heel raises x20  Standing heel raises R/L 10x  Moflex stretch 20\"x3     Ant step ups, 6\" x 15 R- DNP  R Ant step up 6\" with march L knee 10x- DNP  R lateral step downs 4\" x 10   Forward step downs 4\" x10 R  Ant step downs R 4\" 5 x     Current HEP:  Current HEP: LAQ, long sit hamstring stretch, HR/TR seated  Supine: QS, SLR, SLR with LE ER, heel " slides(seated and supine)  Standing: B HR, MIP, hamstring curls, lunge into knee flex   Ant step up    Activity tolerance:  Good    OP EDUCATION:    Assessment:       Mild increase in R knee pain with anterior step downs, completed exercise with decreased BUE support needed on rails.  Pt advised to practice anterior step downs w/ BUE support on rails at home, pt verbalized understanding. Decreased R knee flexion vs last visit, potential inter-rater reliability disparity. Denied increased R knee pain after appointment. Pt concerned about decreased in R knee flexion measured today.  Pt's response to treatment: Good  Areas of improvements: Improved ability to perform anterior and lateral step downs  Limitations/deficits: Decreased R knee ROM into flexion.    Pain end of session: 0/10    Plan:    Continue with current POC/no changes    Assessment of current progress against goals:  Progressing toward functional goals and Symptomatic relief with treatment    Goals:      Active         PT Problem : R TKA         PT STG (Met)         Start:  06/03/24    Expected End:  08/02/24    Resolved:  07/02/24     Pt will be demonstrate increased R knee AROM 0-110 deg so pt can move sit to stand from lower surfaces without UE assistance.  Pt will be able to stand > 10 min with 5-% less pain.           PT Goal 2 (Progressing)         Start:  06/03/24    Expected End:  09/01/24        Pt will demonstrate increased strength in R LE by > 1/2 MMT grade so pt can ascend stairs reciprocally with 1 rail.  Pt will be independent with HEP to allow pt to get in / out of car and on/ off tractor without difficulty.  Pt will resume functional activities and be able to walk in community safely for > 30 minutes without LOB.               Patient Stated Goal 1 (Progressing)         Start:  06/03/24    Expected End:  09/01/24        Pt will be able to walk and climb stairs without pain.

## 2024-07-05 ENCOUNTER — TREATMENT (OUTPATIENT)
Dept: PHYSICAL THERAPY | Facility: CLINIC | Age: 79
End: 2024-07-05
Payer: MEDICARE

## 2024-07-05 DIAGNOSIS — M17.11 UNILATERAL PRIMARY OSTEOARTHRITIS, RIGHT KNEE: ICD-10-CM

## 2024-07-05 PROCEDURE — 97110 THERAPEUTIC EXERCISES: CPT | Mod: CQ,GP | Performed by: SPECIALIST/TECHNOLOGIST

## 2024-07-06 ENCOUNTER — LAB (OUTPATIENT)
Dept: LAB | Facility: LAB | Age: 79
End: 2024-07-06
Payer: MEDICARE

## 2024-07-06 DIAGNOSIS — E03.9 HYPOTHYROIDISM, UNSPECIFIED TYPE: ICD-10-CM

## 2024-07-06 DIAGNOSIS — E78.5 HYPERLIPIDEMIA, UNSPECIFIED HYPERLIPIDEMIA TYPE: ICD-10-CM

## 2024-07-06 LAB
ALBUMIN SERPL-MCNC: 4.4 G/DL (ref 3.5–5)
ALP BLD-CCNC: 72 U/L (ref 35–125)
ALT SERPL-CCNC: 9 U/L (ref 5–40)
ANION GAP SERPL CALC-SCNC: 9 MMOL/L
APPEARANCE UR: CLEAR
AST SERPL-CCNC: 9 U/L (ref 5–40)
BASOPHILS # BLD AUTO: 0.06 X10*3/UL (ref 0–0.1)
BASOPHILS NFR BLD AUTO: 1 %
BILIRUB SERPL-MCNC: 0.6 MG/DL (ref 0.1–1.2)
BILIRUB UR STRIP.AUTO-MCNC: NEGATIVE MG/DL
BUN SERPL-MCNC: 18 MG/DL (ref 8–25)
CALCIUM SERPL-MCNC: 9.7 MG/DL (ref 8.5–10.4)
CHLORIDE SERPL-SCNC: 101 MMOL/L (ref 97–107)
CHOLEST SERPL-MCNC: 139 MG/DL (ref 133–200)
CHOLEST/HDLC SERPL: 2.6 {RATIO}
CO2 SERPL-SCNC: 30 MMOL/L (ref 24–31)
COLOR UR: COLORLESS
CREAT SERPL-MCNC: 1.2 MG/DL (ref 0.4–1.6)
EGFRCR SERPLBLD CKD-EPI 2021: 62 ML/MIN/1.73M*2
EOSINOPHIL # BLD AUTO: 0.31 X10*3/UL (ref 0–0.4)
EOSINOPHIL NFR BLD AUTO: 5.4 %
ERYTHROCYTE [DISTWIDTH] IN BLOOD BY AUTOMATED COUNT: 14 % (ref 11.5–14.5)
GLUCOSE SERPL-MCNC: 102 MG/DL (ref 65–99)
GLUCOSE UR STRIP.AUTO-MCNC: NORMAL MG/DL
HCT VFR BLD AUTO: 47.3 % (ref 41–52)
HDLC SERPL-MCNC: 54 MG/DL
HGB BLD-MCNC: 15.2 G/DL (ref 13.5–17.5)
IMM GRANULOCYTES # BLD AUTO: 0.02 X10*3/UL (ref 0–0.5)
IMM GRANULOCYTES NFR BLD AUTO: 0.3 % (ref 0–0.9)
KETONES UR STRIP.AUTO-MCNC: NEGATIVE MG/DL
LDLC SERPL CALC-MCNC: 69 MG/DL (ref 65–130)
LEUKOCYTE ESTERASE UR QL STRIP.AUTO: NEGATIVE
LYMPHOCYTES # BLD AUTO: 1.17 X10*3/UL (ref 0.8–3)
LYMPHOCYTES NFR BLD AUTO: 20.3 %
MCH RBC QN AUTO: 29.3 PG (ref 26–34)
MCHC RBC AUTO-ENTMCNC: 32.1 G/DL (ref 32–36)
MCV RBC AUTO: 91 FL (ref 80–100)
MONOCYTES # BLD AUTO: 0.61 X10*3/UL (ref 0.05–0.8)
MONOCYTES NFR BLD AUTO: 10.6 %
NEUTROPHILS # BLD AUTO: 3.58 X10*3/UL (ref 1.6–5.5)
NEUTROPHILS NFR BLD AUTO: 62.4 %
NITRITE UR QL STRIP.AUTO: NEGATIVE
NRBC BLD-RTO: 0 /100 WBCS (ref 0–0)
PH UR STRIP.AUTO: 6 [PH]
PLATELET # BLD AUTO: 242 X10*3/UL (ref 150–450)
POTASSIUM SERPL-SCNC: 4.9 MMOL/L (ref 3.4–5.1)
PROT SERPL-MCNC: 6.4 G/DL (ref 5.9–7.9)
PROT UR STRIP.AUTO-MCNC: NEGATIVE MG/DL
RBC # BLD AUTO: 5.19 X10*6/UL (ref 4.5–5.9)
RBC # UR STRIP.AUTO: NEGATIVE /UL
SODIUM SERPL-SCNC: 140 MMOL/L (ref 133–145)
SP GR UR STRIP.AUTO: 1.01
TRIGL SERPL-MCNC: 81 MG/DL (ref 40–150)
TSH SERPL DL<=0.05 MIU/L-ACNC: 2.72 MIU/L (ref 0.27–4.2)
UROBILINOGEN UR STRIP.AUTO-MCNC: NORMAL MG/DL
WBC # BLD AUTO: 5.8 X10*3/UL (ref 4.4–11.3)

## 2024-07-06 PROCEDURE — 80061 LIPID PANEL: CPT

## 2024-07-06 PROCEDURE — 80053 COMPREHEN METABOLIC PANEL: CPT

## 2024-07-06 PROCEDURE — 84443 ASSAY THYROID STIM HORMONE: CPT

## 2024-07-06 PROCEDURE — 81003 URINALYSIS AUTO W/O SCOPE: CPT

## 2024-07-06 PROCEDURE — 36415 COLL VENOUS BLD VENIPUNCTURE: CPT

## 2024-07-06 PROCEDURE — 85025 COMPLETE CBC W/AUTO DIFF WBC: CPT

## 2024-07-09 ENCOUNTER — TREATMENT (OUTPATIENT)
Dept: PHYSICAL THERAPY | Facility: CLINIC | Age: 79
End: 2024-07-09
Payer: MEDICARE

## 2024-07-09 DIAGNOSIS — E78.5 HYPERLIPIDEMIA, UNSPECIFIED HYPERLIPIDEMIA TYPE: ICD-10-CM

## 2024-07-09 DIAGNOSIS — M17.11 UNILATERAL PRIMARY OSTEOARTHRITIS, RIGHT KNEE: ICD-10-CM

## 2024-07-09 DIAGNOSIS — E03.9 HYPOTHYROIDISM, UNSPECIFIED TYPE: ICD-10-CM

## 2024-07-09 PROCEDURE — 97110 THERAPEUTIC EXERCISES: CPT | Mod: GP

## 2024-07-09 NOTE — PROGRESS NOTES
"    Physical Therapy Treatment    Patient Name: Víctor Madison  MRN: [id number]  Encounter date:  7/9/2024  Time Calculation  Start Time: 0818  Stop Time: 0900  Time Calculation (min): 42 min     PT Therapeutic Procedures Time Entry  Therapeutic Exercise Time Entry: 43    Visit Number:  11 (including evaluation)  Planned total visits:  POC 19  Visits Authorized/Insurance Coverage:  MEDICARE A&B, [place], [place], [place] ( $0 USED PT/ST)    Current Problem  Problem List Items Addressed This Visit             ICD-10-CM    Unilateral primary osteoarthritis, right knee M17.11       Surgery  R TKA 5/6/24     Precautions   Above surgery    Pain   2/10    Subjective  General   Pt reports his knee is more stiff although he has not been bending it more than 2x/ day.  Pt reports he did a lot of walking recently.  Balance is feeling excellent.  Pt reports stairs are more difficult due to stiffness.      Objective  Pt walks without device.  Sit to stand without UE support.  Genuvarus L knee.      Treatment:  THERAPEUTIC EXS:  Nustep L2 for 7 min (seat at 9)  BLE only  Pt and his wife educated on improtance of stretching knee while sitting or standing throughout day in addition to 2 sets of stretching exs.  Pt performed LAQs x10 and seated knee flex x3 reps , 3 times while seated in chair.       Supine hamstring stretch with strap 20\"x3 R    Quad sets long sit 5\"x20 with last 10 done with heel prop  SLR R 15x slowly    Supine on treatment table:  Heel slides AROM 2x10  AAROM heel slides with strap for OP in supine 10\"x6  AROM with PT holding foot between heel slides to prevent pt moving into knee ext with pt progressively flexing knee more.     Upright bike 5 min with seat lowered at 3 minutes    Standing B heel raises x15 without UE support  Standing heel raises R/L 10x  Moflex stretch 20\"x3         Current HEP:  Current HEP: LAQ, long sit hamstring stretch, HR/TR seated  Supine: QS, SLR, SLR with LE ER, heel slides(seated and " supine)  Standing: B HR, MIP, hamstring curls, lunge into knee flex   Ant step ups      Activity tolerance:  Good    Assessment:     Pt's response to treatment:  Pt's knee AROM 0-123 deg at end of session.  Pt able to achieve 125 deg flexion today AAROM.  Pt needed encouragement to bend knee more during ADLs.    Areas of improvements:  knee ROM  Limitations/deficits:  feeling of tightness, steps    Pain end of session: 1/10    Plan:     Continue with current POC/no changes. Work on steps     Assessment of current progress against goals:  Progressing toward functional goals    Goals:  Active       PT Problem : R TKA       PT STG (Met)       Start:  06/03/24    Expected End:  08/02/24    Resolved:  07/02/24    Pt will be demonstrate increased R knee AROM 0-110 deg so pt can move sit to stand from lower surfaces without UE assistance.  Pt will be able to stand > 10 min with 5-% less pain.         PT Goal 2 (Progressing)       Start:  06/03/24    Expected End:  09/01/24       Pt will demonstrate increased strength in R LE by > 1/2 MMT grade so pt can ascend stairs reciprocally with 1 rail.  Pt will be independent with HEP to allow pt to get in / out of car and on/ off tractor without difficulty.  Pt will resume functional activities and be able to walk in community safely for > 30 minutes without LOB.            Patient Stated Goal 1 (Progressing)       Start:  06/03/24    Expected End:  09/01/24       Pt will be able to walk and climb stairs without pain.

## 2024-07-11 ENCOUNTER — APPOINTMENT (OUTPATIENT)
Dept: PRIMARY CARE | Facility: CLINIC | Age: 79
End: 2024-07-11
Payer: MEDICARE

## 2024-07-11 ENCOUNTER — TREATMENT (OUTPATIENT)
Dept: PHYSICAL THERAPY | Facility: CLINIC | Age: 79
End: 2024-07-11
Payer: MEDICARE

## 2024-07-11 VITALS
HEART RATE: 56 BPM | SYSTOLIC BLOOD PRESSURE: 142 MMHG | WEIGHT: 157 LBS | TEMPERATURE: 98.3 F | BODY MASS INDEX: 24.64 KG/M2 | DIASTOLIC BLOOD PRESSURE: 72 MMHG | HEIGHT: 67 IN | OXYGEN SATURATION: 99 %

## 2024-07-11 DIAGNOSIS — Z00.00 ROUTINE GENERAL MEDICAL EXAMINATION AT HEALTH CARE FACILITY: Primary | ICD-10-CM

## 2024-07-11 DIAGNOSIS — M25.571 ACUTE RIGHT ANKLE PAIN: ICD-10-CM

## 2024-07-11 DIAGNOSIS — M17.11 UNILATERAL PRIMARY OSTEOARTHRITIS, RIGHT KNEE: ICD-10-CM

## 2024-07-11 DIAGNOSIS — E03.9 HYPOTHYROIDISM, UNSPECIFIED TYPE: ICD-10-CM

## 2024-07-11 DIAGNOSIS — N40.0 BENIGN PROSTATIC HYPERPLASIA, UNSPECIFIED WHETHER LOWER URINARY TRACT SYMPTOMS PRESENT: ICD-10-CM

## 2024-07-11 DIAGNOSIS — E78.5 HYPERLIPIDEMIA, UNSPECIFIED HYPERLIPIDEMIA TYPE: ICD-10-CM

## 2024-07-11 PROCEDURE — 1170F FXNL STATUS ASSESSED: CPT | Performed by: STUDENT IN AN ORGANIZED HEALTH CARE EDUCATION/TRAINING PROGRAM

## 2024-07-11 PROCEDURE — 1160F RVW MEDS BY RX/DR IN RCRD: CPT | Performed by: STUDENT IN AN ORGANIZED HEALTH CARE EDUCATION/TRAINING PROGRAM

## 2024-07-11 PROCEDURE — G0439 PPPS, SUBSEQ VISIT: HCPCS | Performed by: STUDENT IN AN ORGANIZED HEALTH CARE EDUCATION/TRAINING PROGRAM

## 2024-07-11 PROCEDURE — 99214 OFFICE O/P EST MOD 30 MIN: CPT | Performed by: STUDENT IN AN ORGANIZED HEALTH CARE EDUCATION/TRAINING PROGRAM

## 2024-07-11 PROCEDURE — 1158F ADVNC CARE PLAN TLK DOCD: CPT | Performed by: STUDENT IN AN ORGANIZED HEALTH CARE EDUCATION/TRAINING PROGRAM

## 2024-07-11 PROCEDURE — 97110 THERAPEUTIC EXERCISES: CPT | Mod: GP

## 2024-07-11 PROCEDURE — 1036F TOBACCO NON-USER: CPT | Performed by: STUDENT IN AN ORGANIZED HEALTH CARE EDUCATION/TRAINING PROGRAM

## 2024-07-11 PROCEDURE — 1159F MED LIST DOCD IN RCRD: CPT | Performed by: STUDENT IN AN ORGANIZED HEALTH CARE EDUCATION/TRAINING PROGRAM

## 2024-07-11 PROCEDURE — 1125F AMNT PAIN NOTED PAIN PRSNT: CPT | Performed by: STUDENT IN AN ORGANIZED HEALTH CARE EDUCATION/TRAINING PROGRAM

## 2024-07-11 PROCEDURE — 1123F ACP DISCUSS/DSCN MKR DOCD: CPT | Performed by: STUDENT IN AN ORGANIZED HEALTH CARE EDUCATION/TRAINING PROGRAM

## 2024-07-11 PROCEDURE — 1157F ADVNC CARE PLAN IN RCRD: CPT | Performed by: STUDENT IN AN ORGANIZED HEALTH CARE EDUCATION/TRAINING PROGRAM

## 2024-07-11 RX ORDER — ATORVASTATIN CALCIUM 20 MG/1
20 TABLET, FILM COATED ORAL DAILY
Qty: 90 TABLET | Refills: 2 | Status: SHIPPED | OUTPATIENT
Start: 2024-07-11

## 2024-07-11 ASSESSMENT — PAIN SCALES - GENERAL: PAINLEVEL: 2

## 2024-07-11 ASSESSMENT — ENCOUNTER SYMPTOMS
BLOOD IN STOOL: 0
CHILLS: 0
LIGHT-HEADEDNESS: 0
TROUBLE SWALLOWING: 0
BACK PAIN: 0
WHEEZING: 0
SHORTNESS OF BREATH: 0
NERVOUS/ANXIOUS: 0
HEADACHES: 0
ABDOMINAL PAIN: 0
DIARRHEA: 0
JOINT SWELLING: 0
COUGH: 0
PALPITATIONS: 0
DYSPHORIC MOOD: 0
FEVER: 0
CONSTIPATION: 0
ARTHRALGIAS: 0
DIZZINESS: 0
DIFFICULTY URINATING: 0

## 2024-07-11 ASSESSMENT — PATIENT HEALTH QUESTIONNAIRE - PHQ9
1. LITTLE INTEREST OR PLEASURE IN DOING THINGS: NOT AT ALL
2. FEELING DOWN, DEPRESSED OR HOPELESS: NOT AT ALL
SUM OF ALL RESPONSES TO PHQ9 QUESTIONS 1 AND 2: 0

## 2024-07-11 ASSESSMENT — ACTIVITIES OF DAILY LIVING (ADL)
DRESSING: INDEPENDENT
DOING_HOUSEWORK: INDEPENDENT
TAKING_MEDICATION: INDEPENDENT
MANAGING_FINANCES: INDEPENDENT
GROCERY_SHOPPING: INDEPENDENT
BATHING: INDEPENDENT

## 2024-07-11 NOTE — PROGRESS NOTES
Subjective   Reason for Visit: Víctor Madison is an 78 y.o. male here for a Medicare Wellness visit.     Past Medical, Surgical, and Family History reviewed and updated in chart.    Reviewed all medications by prescribing practitioner or clinical pharmacist (such as prescriptions, OTCs, herbal therapies and supplements) and documented in the medical record.    HPI    INTERVAL HX/CURRENT CONCERNS:  5/2023 Total knee replacement Rt. Doing well. Still doing PT. Done through [facility].  About 1 week hx for mild Rt ankle pain. Location in the front of the ankle. Has started cutting grass on riding mower last week. Uses the Rt foot to push on the gas.     CHRONIC CONDITIONS:  CKD  Elevated PSA - persistent mild elevation - no prior work up  HLD- atorvastatin 20 mg  IFG  Hypothyroidism - TSH 2.72   BPH - tamsulosin 0.8 mg daily    Health Maintenance  Immunizations:     Tdap    Pneumococcal - done    Shingles - shingrix x 2    COVID - discussed updated vaccine    Influenza - 10/2023    RSV - 10/2023    Colonoscopy: screening discontinued for age  Lung cancer screening: NA    Male Specific   PSA - screening discontinued for age   AAA screen: NA      Patient Care Team:  Monae Moura MD as PCP - General (Family Medicine)     Review of Systems   Constitutional:  Negative for chills and fever.   HENT:  Negative for trouble swallowing.    Eyes:  Negative for visual disturbance.   Respiratory:  Negative for cough, shortness of breath and wheezing.    Cardiovascular:  Negative for chest pain and palpitations.   Gastrointestinal:  Negative for abdominal pain, blood in stool, constipation and diarrhea.   Genitourinary:  Negative for difficulty urinating.   Musculoskeletal:  Negative for arthralgias, back pain and joint swelling.        Rt ankle pain   Skin:  Negative for rash.   Neurological:  Negative for dizziness, light-headedness and headaches.   Psychiatric/Behavioral:  Negative for dysphoric mood. The patient is  "not nervous/anxious.        Objective   Vitals:  /82 (BP Location: Left arm)   Pulse 56   Temp 36.8 °C (98.3 °F) (Temporal)   Ht 1.702 m (5' 7\")   Wt 71.2 kg (157 lb)   SpO2 99%   BMI 24.59 kg/m²       Physical Exam  Vitals and nursing note reviewed.   Constitutional:       Appearance: Normal appearance.   HENT:      Head: Normocephalic and atraumatic.      Right Ear: Tympanic membrane, ear canal and external ear normal.      Left Ear: Tympanic membrane, ear canal and external ear normal.      Mouth/Throat:      Mouth: Mucous membranes are moist.      Pharynx: Oropharynx is clear.   Eyes:      Extraocular Movements: Extraocular movements intact.      Conjunctiva/sclera: Conjunctivae normal.      Pupils: Pupils are equal, round, and reactive to light.   Cardiovascular:      Rate and Rhythm: Normal rate and regular rhythm.      Heart sounds: Normal heart sounds. No murmur heard.  Pulmonary:      Effort: Pulmonary effort is normal.      Breath sounds: Normal breath sounds. No wheezing, rhonchi or rales.   Abdominal:      General: Abdomen is flat.      Palpations: Abdomen is soft.      Tenderness: There is no abdominal tenderness.   Musculoskeletal:         General: Normal range of motion.      Cervical back: Normal range of motion and neck supple.      Right foot: Normal range of motion. No swelling, deformity or tenderness.   Skin:     General: Skin is warm and dry.   Neurological:      Mental Status: He is alert.   Psychiatric:         Mood and Affect: Mood normal.         Behavior: Behavior normal.         Assessment/Plan   1. Routine general medical examination at health care facility  Well adult exam.  1. Age appropriate preventative measures reviewed.   2. Encouraged healthy diet and exercise.  3. Immunizations- Reviewed and discussed  4. Labs- Reviewed and discussed with patient  5. Medications- Reviewed  - 1 Year Follow Up In Primary Care - Wellness Exam; Future    2. Acute right ankle pain  Suspect " related to operating  pedal with Rt foot. Can use OTC analgesics as needed. ROM exercises. Follow up with worsening or persistence.     3. Hyperlipidemia, unspecified hyperlipidemia type  Lipid panel reviewed. Continue medication. Low fat diet. Regular exercise.    4. Hypothyroidism, unspecified type  Euthyroid on current dose of levothyroxine. Continue to take on empty stomach. Recheck TSH in 1 year or sooner if develops symptoms    5. Benign prostatic hyperplasia, unspecified whether lower urinary tract symptoms present  Well controlled with once daily tamsulosin. Tolerating well without side effects.

## 2024-07-11 NOTE — PROGRESS NOTES
"    Physical Therapy Treatment    Patient Name: Víctor Madison  MRN: [id number]  Encounter date:  7/11/2024  Time Calculation  Start Time: 1359  Stop Time: 1445  Time Calculation (min): 46 min     PT Therapeutic Procedures Time Entry  Therapeutic Exercise Time Entry: 45    Visit Number:  12 (including evaluation)  Planned total visits:  POC 19  Visits Authorized/Insurance Coverage:  MEDICARE A&B, [place], [place] ( $0 USED PT/ST)    Current Problem  Problem List Items Addressed This Visit             ICD-10-CM    Unilateral primary osteoarthritis, right knee M17.11         Surgery  R TKA 5/6/24     Precautions   Above surgery    Pain   1/10    Subjective  General   Pt reports his knee is feeling good.  He is having some anterior R ankle pain since last evening.  He did mow grass on Tuesday.      Objective  R knee flex AROM 1-126 degrees, AAROM 0-128 degrees    Treatment:  THERAPEUTIC EXS:  Bike L1 for 7 min with height of bike seat lessened to increase ROM     Supine hamstring stretch with strap 20\"x3 R    Quad sets long sit 5\"x20 with last 10 done with heel prop  SLR R 15x slowly    Supine on treatment table:  Heel slides AROM 2x10  AAROM heel slides with strap for OP in supine 10\"x6  AROM with PT holding foot between heel slides to prevent pt moving into knee ext with pt progressively flexing knee more.       Standing B heel raises x15 without UE support  Standing heel raises R/L 10x  Moflex stretch 20\"x3  Ant step ups 6\" 10x R  Ant step ups with march 6\" 12x R without UE assist  Lateral heel taps 6\" 10x R  Ant step downs R 6\" 8x  Forward step downs 6\" 5x       Current HEP:  Current HEP: LAQ, long sit hamstring stretch, HR/TR seated  Supine: QS, SLR, SLR with LE ER, heel slides(seated and supine)  Standing: B HR, MIP, hamstring curls, lunge into knee flex   Ant step ups      Activity tolerance:  Good    Assessment:     Pt's response to treatment:  Pt's R knee ROM continues to improve.  Pt able to perform " forward step down easier at end of session.  LE control improving   Areas of improvements:  knee ROM  Limitations/deficits:  feeling of tightness, steps    Pain end of session: 1/10    Plan:     Continue with current POC/no changes.     Assessment of current progress against goals:  Progressing toward functional goals    Goals:  Active       PT Problem : R TKA       PT STG (Met)       Start:  06/03/24    Expected End:  08/02/24    Resolved:  07/02/24    Pt will be demonstrate increased R knee AROM 0-110 deg so pt can move sit to stand from lower surfaces without UE assistance.  Pt will be able to stand > 10 min with 5-% less pain.         PT Goal 2 (Progressing)       Start:  06/03/24    Expected End:  09/01/24       Pt will demonstrate increased strength in R LE by > 1/2 MMT grade so pt can ascend stairs reciprocally with 1 rail.  Pt will be independent with HEP to allow pt to get in / out of car and on/ off tractor without difficulty.  Pt will resume functional activities and be able to walk in community safely for > 30 minutes without LOB.            Patient Stated Goal 1 (Progressing)       Start:  06/03/24    Expected End:  09/01/24       Pt will be able to walk and climb stairs without pain.

## 2024-07-16 ENCOUNTER — TREATMENT (OUTPATIENT)
Dept: PHYSICAL THERAPY | Facility: CLINIC | Age: 79
End: 2024-07-16
Payer: MEDICARE

## 2024-07-16 DIAGNOSIS — M17.11 UNILATERAL PRIMARY OSTEOARTHRITIS, RIGHT KNEE: ICD-10-CM

## 2024-07-16 PROCEDURE — 97110 THERAPEUTIC EXERCISES: CPT | Mod: GP

## 2024-07-16 NOTE — PROGRESS NOTES
"    Physical Therapy Treatment    Patient Name: Víctor Madison  MRN: [id number]  Encounter date:  7/16/2024  Time Calculation  Start Time: 0902  Stop Time: 0946  Time Calculation (min): 44 min     PT Therapeutic Procedures Time Entry  Therapeutic Exercise Time Entry: 43    Visit Number:  13 (including evaluation)  Planned total visits:  POC 19  Visits Authorized/Insurance Coverage:  MEDICARE A&B, [place], [place] ( $0 USED PT/ST)    Current Problem  Problem List Items Addressed This Visit             ICD-10-CM    Unilateral primary osteoarthritis, right knee M17.11           Surgery  R TKA 5/6/24     Precautions   Above surgery    Pain   1/10    Subjective  General   Pt reports knee is stiff.  He has not tried descending stairs reciprocally.  He still has discomfort with heel slides.       Objective  Pt has pain with heel slides and some with recumbent bike as he is using hasmtrings more than his quads.    Treatment:  THERAPEUTIC EXS:  Recumbent Bike L1 for 7 min with R foot on top of foot strap.  Pt had popping in post medial L knee with foot in foot strap.    Long sit hamstring stretch with strap 20\"x3 R  Standing B heel raises x15 without UE support  Standing heel raises R/L 12x  Moflex stretch 20\"x3  Ant step ups 6\" 10x R  Ant step ups with march 6\" 12x R without UE assist  Lateral heel taps 6\" 10x R  Forward step downs 6\" 10x  Ant step ups 8\" 10x R  Ant step ups with march 8\" 10x R   Forward step downs 8\" 8x  Pt then ascended and descended 3 7.5 inch steps reciprocally with rail f/b 5 7.5 inch steps x2 then 11 7.5\"steps x2 with 1 rail descending stairs.    Quad sets long sit 5\"x20 with last 10 done with heel prop  SLR R 15x slowly    Supine on treatment table:  Heel slides AROM 2x10  AAROM heel slides with strap for OP in supine 10\"x3  .   Current HEP:  Current HEP: LAQ, long sit hamstring stretch, HR/TR seated  Supine: QS, SLR, SLR with LE ER, heel slides(seated and supine)  Standing: B HR, MIP, hamstring " curls, lunge into knee flex   Ant step ups      Activity tolerance:  Good    Assessment:     Pt's response to treatment:  Pt able to ascend and descend flight of stairs with 1 rail reciprocally.  Pt is progressing toward goals.  Areas of improvements:  knee ROM, stairs  Limitations/deficits:  feeling of tightness, confidence with stairs    Pain end of session: 2/10    Plan:     Continue with current POC/no changes.     Assessment of current progress against goals:  Progressing toward functional goals    Goals:  Active       PT Problem : R TKA       PT STG (Met)       Start:  06/03/24    Expected End:  08/02/24    Resolved:  07/02/24    Pt will be demonstrate increased R knee AROM 0-110 deg so pt can move sit to stand from lower surfaces without UE assistance.  Pt will be able to stand > 10 min with 5-% less pain.         PT Goal 2 (Progressing)       Start:  06/03/24    Expected End:  09/01/24       Pt will demonstrate increased strength in R LE by > 1/2 MMT grade so pt can ascend stairs reciprocally with 1 rail.  Pt will be independent with HEP to allow pt to get in / out of car and on/ off tractor without difficulty.  Pt will resume functional activities and be able to walk in community safely for > 30 minutes without LOB.            Patient Stated Goal 1 (Progressing)       Start:  06/03/24    Expected End:  09/01/24       Pt will be able to walk and climb stairs without pain.

## 2024-07-17 NOTE — PROGRESS NOTES
"    Physical Therapy Treatment    Patient Name: Víctor Madison  MRN: [id number]  Encounter date:  7/18/2024  Time Calculation  Start Time: 0901  Stop Time: 0946  Time Calculation (min): 45 min     PT Therapeutic Procedures Time Entry  Therapeutic Exercise Time Entry: 42    Visit Number:  14 (including evaluation)  Planned total visits:  POC 19  Visits Authorized/Insurance Coverage:  MEDICARE A&B, [place], [place] ( $0 USED PT/ST)    Current Problem  Problem List Items Addressed This Visit             ICD-10-CM    Unilateral primary osteoarthritis, right knee M17.11       Surgery  R TKA 5/6/24     Precautions   Above surgery    Pain   0/10    Subjective  General   Pt reports his knee/ ankle is less swollen.  He was able to use tractor to mow grass with no issues.  He feels he is progressing overall.    Objective  Non-antalgic gait.      Treatment:  THERAPEUTIC EXS:  Nustep L4 for 7 min    Long sit hamstring stretch with strap 20\"x3 R  Standing B heel raises x15 without UE support  Standing heel raises R/L 12x  Moflex stretch 20\"x3  SLS 12\"x3 R/L with 0-1 UE assist  Ant step ups 7.5\" 10x R  Pt then ascended and descended 3 7.5 inch steps reciprocally with rail f/b 5 7.5 inch steps x2 then 11 7.5\"steps x3 with 1 rail descending stairs.    Quad sets long sit 5\"x20 with last 10 done with heel prop  SLR R 15x slowly    Supine on treatment table:  Heel slides AROM 2x10  AAROM heel slides with strap for OP in supine 10\"x3   Heel slides with PT assist to maintain knee flexion 10\"x5  .   Current HEP:  Current HEP: LAQ, long sit hamstring stretch, HR/TR seated  Supine: QS, SLR, SLR with LE ER, heel slides(seated and supine)  Standing: B HR, MIP, hamstring curls, lunge into knee flex   Ant step ups      Activity tolerance:  Good    Assessment:     Pt's response to treatment:  Pt is progressing toward goals.   Areas of improvements:  knee ROM, stairs  Limitations/deficits:  feeling of tightness, confidence with " stairs    Pain end of session: 0/10    Plan:     Continue with current POC with the following changes will hold PT until week of 7/29/24 with pt to continue exs at home and assess then .     Assessment of current progress against goals:  Progressing toward functional goals    Goals:  Active       PT Problem : R TKA       PT STG (Met)       Start:  06/03/24    Expected End:  08/02/24    Resolved:  07/02/24    Pt will be demonstrate increased R knee AROM 0-110 deg so pt can move sit to stand from lower surfaces without UE assistance.  Pt will be able to stand > 10 min with 5-% less pain.         PT Goal 2 (Progressing)       Start:  06/03/24    Expected End:  09/01/24       Pt will demonstrate increased strength in R LE by > 1/2 MMT grade so pt can ascend stairs reciprocally with 1 rail.  Pt will be independent with HEP to allow pt to get in / out of car and on/ off tractor without difficulty.  Pt will resume functional activities and be able to walk in community safely for > 30 minutes without LOB.            Patient Stated Goal 1 (Progressing)       Start:  06/03/24    Expected End:  09/01/24       Pt will be able to walk and climb stairs without pain.

## 2024-07-18 ENCOUNTER — TREATMENT (OUTPATIENT)
Dept: PHYSICAL THERAPY | Facility: CLINIC | Age: 79
End: 2024-07-18
Payer: MEDICARE

## 2024-07-18 DIAGNOSIS — M17.11 UNILATERAL PRIMARY OSTEOARTHRITIS, RIGHT KNEE: ICD-10-CM

## 2024-07-18 PROCEDURE — 97110 THERAPEUTIC EXERCISES: CPT | Mod: GP

## 2024-07-23 ENCOUNTER — APPOINTMENT (OUTPATIENT)
Dept: PHYSICAL THERAPY | Facility: CLINIC | Age: 79
End: 2024-07-23
Payer: MEDICARE

## 2024-07-25 ENCOUNTER — APPOINTMENT (OUTPATIENT)
Dept: PHYSICAL THERAPY | Facility: CLINIC | Age: 79
End: 2024-07-25
Payer: MEDICARE

## 2024-07-30 ENCOUNTER — APPOINTMENT (OUTPATIENT)
Dept: PHYSICAL THERAPY | Facility: CLINIC | Age: 79
End: 2024-07-30
Payer: MEDICARE

## 2024-08-01 ENCOUNTER — TREATMENT (OUTPATIENT)
Dept: PHYSICAL THERAPY | Facility: CLINIC | Age: 79
End: 2024-08-01
Payer: MEDICARE

## 2024-08-01 DIAGNOSIS — M17.11 UNILATERAL PRIMARY OSTEOARTHRITIS, RIGHT KNEE: ICD-10-CM

## 2024-08-01 PROCEDURE — 97110 THERAPEUTIC EXERCISES: CPT | Mod: GP

## 2024-08-01 NOTE — PROGRESS NOTES
"    Physical Therapy Treatment    Patient Name: Víctor Madison  MRN: [id number]  Encounter date:  8/1/2024  Time Calculation  Start Time: 0919  Stop Time: 1002  Time Calculation (min): 43 min     PT Therapeutic Procedures Time Entry  Therapeutic Exercise Time Entry: 40    Visit Number:  15 (including evaluation)  Planned total visits:  POC 19  Visits Authorized/Insurance Coverage:  MEDICARE A&B, [place], [place] ( $0 USED PT/ST)    Current Problem  Problem List Items Addressed This Visit             ICD-10-CM    Unilateral primary osteoarthritis, right knee M17.11         Surgery  R TKA 5/6/24     Precautions   Above surgery    Pain   1/10    Subjective  General   Minimal tenderness lateral R knee pain.  Pt notes he has some tenderness in his ant L ankle.  Pt is descending stairs at home reciprocally for 6 steps 3 times.     Objective  Pt concerned with R foot slightly toeing out.  He got a wobble board at home to rock back and forth on ankles.  R ankle DF limited to about 5 degrees passively with foot in ST neutral.  Pt reports h/o bad R ankle sprain.      Treatment:  THERAPEUTIC EXS:  Bike L1-3 for 8 min- PT educated pt on R ankle ROM and how DF affects joint- pt has pain in ant ankle with trying to move R ankle into more DF    Ant step ups 7.5\" R 10x  Lateral step downs 7.5\" 10x  Stretch into knee flexion on 2nd step 10\"x5  Pt then ascended and descended 11 7.5 inch steps reciprocally without rail x3    Long sit hamstring stretch with strap 20\"x3 R  Standing B heel raises x15 without UE support  Standing heel raises R/L 12x  Moflex stretch 20\"x3  Seated DF on treatment table    Quad sets long sit 5\"x10  PT, pt and pt's wife discussed HEP, progression of walking program and return to gym.  Pt encouraged to speak with surgeon at appointment about restrictions at gym- (hopping, jumping)      Current HEP:  Current HEP: LAQ, long sit hamstring stretch, HR/TR seated  Supine: QS, SLR, SLR with LE ER, heel " slides(seated and supine)  Standing: B HR, MIP, hamstring curls, lunge into knee flex   Ant step ups      Activity tolerance:  Good    Assessment:     Pt's response to treatment: Pt has progressed toward achieving PT goals.  He is independent with HEP to date but wants to return to one more session in about 3 weeks to update if needed.  Areas of improvements:  knee ROM, stairs  Limitations/deficits:  feeling of tightness, confidence with stairs    Pain end of session: 0/10    Plan:     Continue with current POC with the following changes pt to return in about 3 weeks with pt to continue exs at home and assess then .     Assessment of current progress against goals:  Progressing toward functional goals    Goals:  Active       PT Problem : R TKA       PT STG (Met)       Start:  06/03/24    Expected End:  08/02/24    Resolved:  07/02/24    Pt will be demonstrate increased R knee AROM 0-110 deg so pt can move sit to stand from lower surfaces without UE assistance.  Pt will be able to stand > 10 min with 5-% less pain.         PT Goal 2 (Progressing)       Start:  06/03/24    Expected End:  09/01/24       Pt will demonstrate increased strength in R LE by > 1/2 MMT grade so pt can ascend stairs reciprocally with 1 rail.  Pt will be independent with HEP to allow pt to get in / out of car and on/ off tractor without difficulty.  Pt will resume functional activities and be able to walk in community safely for > 30 minutes without LOB.            Patient Stated Goal 1 (Progressing)       Start:  06/03/24    Expected End:  09/01/24       Pt will be able to walk and climb stairs without pain.

## 2024-08-21 ENCOUNTER — DOCUMENTATION (OUTPATIENT)
Dept: PHYSICAL THERAPY | Facility: CLINIC | Age: 79
End: 2024-08-21
Payer: MEDICARE

## 2024-08-21 NOTE — PROGRESS NOTES
Physical Therapy                 Therapy Communication Note    Patient Name: Víctor Madison  MRN: [id number]  Today's Date: 8/21/2024     Discipline: Physical Therapy    Missed Visit Reason:  Pt called today to cancel PT appointment for 8/23/24.  Pt is doing well and wishes to be placed on hold for 30 days.    Missed Time: Cancel 8/23/24

## 2024-08-23 ENCOUNTER — APPOINTMENT (OUTPATIENT)
Dept: PHYSICAL THERAPY | Facility: CLINIC | Age: 79
End: 2024-08-23
Payer: MEDICARE

## 2024-09-18 DIAGNOSIS — E03.9 HYPOTHYROIDISM, UNSPECIFIED TYPE: ICD-10-CM

## 2024-09-18 DIAGNOSIS — E78.5 HYPERLIPIDEMIA, UNSPECIFIED HYPERLIPIDEMIA TYPE: ICD-10-CM

## 2024-09-19 RX ORDER — LEVOTHYROXINE SODIUM 50 UG/1
50 TABLET ORAL DAILY
Qty: 90 TABLET | Refills: 0 | Status: SHIPPED | OUTPATIENT
Start: 2024-09-19

## 2024-09-30 ENCOUNTER — DOCUMENTATION (OUTPATIENT)
Dept: PHYSICAL THERAPY | Facility: CLINIC | Age: 79
End: 2024-09-30
Payer: MEDICARE

## 2024-09-30 NOTE — PROGRESS NOTES
Physical Therapy    Discharge Summary    Name: Víctor Madison  MRN: [id number]  : 1945  Date: 24    Discharge Summary: PT    Discharge Information: Date of last visit 24 and Number of attended visits 15    Therapy Summary: Pt was independent with HEP and had achieved all goals.  Pt on hold for one month.  Pt did not return to PT.    Rehab Discharge Reason: Achieved all and/or the most significant goals(s)

## 2024-11-20 ENCOUNTER — OFFICE VISIT (OUTPATIENT)
Dept: PRIMARY CARE | Facility: CLINIC | Age: 79
End: 2024-11-20
Payer: MEDICARE

## 2024-11-20 VITALS
SYSTOLIC BLOOD PRESSURE: 126 MMHG | WEIGHT: 154 LBS | HEART RATE: 54 BPM | BODY MASS INDEX: 24.12 KG/M2 | DIASTOLIC BLOOD PRESSURE: 74 MMHG | TEMPERATURE: 98 F | OXYGEN SATURATION: 99 %

## 2024-11-20 DIAGNOSIS — J01.90 ACUTE RHINOSINUSITIS: ICD-10-CM

## 2024-11-20 DIAGNOSIS — J06.9 UPPER RESPIRATORY TRACT INFECTION, UNSPECIFIED TYPE: Primary | ICD-10-CM

## 2024-11-20 LAB
POC BINAX EXPIRATION: NORMAL
POC BINAX NOW COVID SERIAL NUMBER: NORMAL
POC RAPID INFLUENZA A: NEGATIVE
POC RAPID INFLUENZA B: NEGATIVE
POC SARS-COV-2 AG BINAX: NORMAL

## 2024-11-20 PROCEDURE — 1159F MED LIST DOCD IN RCRD: CPT | Performed by: STUDENT IN AN ORGANIZED HEALTH CARE EDUCATION/TRAINING PROGRAM

## 2024-11-20 PROCEDURE — 1157F ADVNC CARE PLAN IN RCRD: CPT | Performed by: STUDENT IN AN ORGANIZED HEALTH CARE EDUCATION/TRAINING PROGRAM

## 2024-11-20 PROCEDURE — 1036F TOBACCO NON-USER: CPT | Performed by: STUDENT IN AN ORGANIZED HEALTH CARE EDUCATION/TRAINING PROGRAM

## 2024-11-20 PROCEDURE — 87811 SARS-COV-2 COVID19 W/OPTIC: CPT | Performed by: STUDENT IN AN ORGANIZED HEALTH CARE EDUCATION/TRAINING PROGRAM

## 2024-11-20 PROCEDURE — 87804 INFLUENZA ASSAY W/OPTIC: CPT | Performed by: STUDENT IN AN ORGANIZED HEALTH CARE EDUCATION/TRAINING PROGRAM

## 2024-11-20 PROCEDURE — 99213 OFFICE O/P EST LOW 20 MIN: CPT | Performed by: STUDENT IN AN ORGANIZED HEALTH CARE EDUCATION/TRAINING PROGRAM

## 2024-11-20 PROCEDURE — 1126F AMNT PAIN NOTED NONE PRSNT: CPT | Performed by: STUDENT IN AN ORGANIZED HEALTH CARE EDUCATION/TRAINING PROGRAM

## 2024-11-20 PROCEDURE — 1158F ADVNC CARE PLAN TLK DOCD: CPT | Performed by: STUDENT IN AN ORGANIZED HEALTH CARE EDUCATION/TRAINING PROGRAM

## 2024-11-20 PROCEDURE — 1123F ACP DISCUSS/DSCN MKR DOCD: CPT | Performed by: STUDENT IN AN ORGANIZED HEALTH CARE EDUCATION/TRAINING PROGRAM

## 2024-11-20 RX ORDER — BENZONATATE 200 MG/1
200 CAPSULE ORAL 3 TIMES DAILY PRN
Qty: 42 CAPSULE | Refills: 0 | Status: SHIPPED | OUTPATIENT
Start: 2024-11-20 | End: 2024-12-20

## 2024-11-20 RX ORDER — AZELASTINE 1 MG/ML
1 SPRAY, METERED NASAL 2 TIMES DAILY
Qty: 30 ML | Refills: 12 | Status: SHIPPED | OUTPATIENT
Start: 2024-11-20 | End: 2025-11-20

## 2024-11-20 RX ORDER — FLUTICASONE PROPIONATE 50 MCG
1 SPRAY, SUSPENSION (ML) NASAL DAILY
Qty: 16 G | Refills: 5 | Status: SHIPPED | OUTPATIENT
Start: 2024-11-20 | End: 2025-11-20

## 2024-11-20 ASSESSMENT — ENCOUNTER SYMPTOMS
CONSTIPATION: 0
MYALGIAS: 0
FREQUENCY: 0
DIZZINESS: 0
FATIGUE: 0
WHEEZING: 0
ARTHRALGIAS: 0
PALPITATIONS: 0
HEADACHES: 0
SINUS PRESSURE: 0
NAUSEA: 0
SHORTNESS OF BREATH: 0
NERVOUS/ANXIOUS: 0
RHINORRHEA: 1
COUGH: 1
LIGHT-HEADEDNESS: 0
POLYDIPSIA: 0
DIARRHEA: 0
SLEEP DISTURBANCE: 0
CHILLS: 1
VOMITING: 0
FEVER: 0
SINUS PAIN: 0
DYSPHORIC MOOD: 0
WOUND: 0
DYSURIA: 0

## 2024-11-20 ASSESSMENT — PAIN SCALES - GENERAL: PAINLEVEL_OUTOF10: 0-NO PAIN

## 2024-11-20 NOTE — PATIENT INSTRUCTIONS
Start with fluticasone in the morning.    Azelastine at night.  Start with each of these once per day.  After 3 days, if not improving, go to twice per day of each.    If symptoms are not improving after 3 days at twice a day, please contact the office.  If you are continuing to get worse over time, please also contact the office.

## 2024-11-20 NOTE — PROGRESS NOTES
Subjective   Patient ID: Víctor Madison is a 78 y.o. male who presents for Sinus Problem (Cough, head pressure, sore throat, nasal congestion x 3 days).    Has had nasal congestion and cough for past 3 days.  Sore throat.  No fevers.  Had chills last night.  Has not tested for COVID.          Review of Systems   Constitutional:  Positive for chills. Negative for fatigue and fever.   HENT:  Positive for congestion and rhinorrhea. Negative for hearing loss, sinus pressure, sinus pain and tinnitus.    Eyes:  Negative for visual disturbance.   Respiratory:  Positive for cough. Negative for shortness of breath and wheezing.    Cardiovascular:  Negative for chest pain, palpitations and leg swelling.   Gastrointestinal:  Negative for constipation, diarrhea, nausea and vomiting.   Endocrine: Negative for cold intolerance, heat intolerance, polydipsia and polyuria.   Genitourinary:  Negative for dysuria, frequency and urgency.   Musculoskeletal:  Negative for arthralgias and myalgias.   Skin:  Negative for pallor, rash and wound.   Neurological:  Negative for dizziness, light-headedness and headaches.   Psychiatric/Behavioral:  Negative for dysphoric mood and sleep disturbance. The patient is not nervous/anxious.        Objective     Visit Vitals  /74 (BP Location: Left arm)   Pulse 54   Temp 36.7 °C (98 °F) (Temporal)   Wt 69.9 kg (154 lb)   SpO2 99%   BMI 24.12 kg/m²   Smoking Status Never   BSA 1.82 m²         Physical Exam  Constitutional:       Appearance: Normal appearance.   HENT:      Head: Normocephalic and atraumatic.      Right Ear: Tympanic membrane, ear canal and external ear normal.      Left Ear: Tympanic membrane, ear canal and external ear normal.      Nose: Congestion and rhinorrhea present. Rhinorrhea is clear.      Right Turbinates: Enlarged and pale.      Left Turbinates: Enlarged and pale.      Right Sinus: No maxillary sinus tenderness or frontal sinus tenderness.      Left Sinus: No maxillary  sinus tenderness or frontal sinus tenderness.      Mouth/Throat:      Mouth: Mucous membranes are moist.      Pharynx: Oropharynx is clear.   Eyes:      Extraocular Movements: Extraocular movements intact.      Conjunctiva/sclera: Conjunctivae normal.      Pupils: Pupils are equal, round, and reactive to light.   Cardiovascular:      Rate and Rhythm: Normal rate and regular rhythm.      Pulses: Normal pulses.      Heart sounds: Normal heart sounds.   Pulmonary:      Effort: Pulmonary effort is normal.      Breath sounds: Normal breath sounds.   Abdominal:      General: There is no distension.      Palpations: Abdomen is soft.      Tenderness: There is no abdominal tenderness.   Musculoskeletal:         General: Normal range of motion.   Skin:     General: Skin is warm and dry.   Neurological:      Mental Status: He is alert and oriented to person, place, and time. Mental status is at baseline.   Psychiatric:         Mood and Affect: Mood normal.         Behavior: Behavior normal.         Assessment & Plan  Upper respiratory tract infection, unspecified type    Orders:    POCT BinaxNOW Covid-19 Ag Card manually resulted    POCT Influenza A/B manually resulted    fluticasone (Flonase) 50 mcg/actuation nasal spray; Administer 1 spray into each nostril once daily. Shake gently. Before first use, prime pump. After use, clean tip and replace cap.    azelastine (Astelin) 137 mcg (0.1 %) nasal spray; Administer 1 spray into each nostril 2 times a day. Use in each nostril as directed    benzonatate (Tessalon) 200 mg capsule; Take 1 capsule (200 mg) by mouth 3 times a day as needed for cough. Do not crush or chew.  Lungs are clear with good air movement.  There is visible rhinorrhea and postnasal drip which I suspect may be contributing to the cough.  I would have a low suspicion for pneumonia at this time.  Acute rhinosinusitis    Orders:    fluticasone (Flonase) 50 mcg/actuation nasal spray; Administer 1 spray into each  nostril once daily. Shake gently. Before first use, prime pump. After use, clean tip and replace cap.    azelastine (Astelin) 137 mcg (0.1 %) nasal spray; Administer 1 spray into each nostril 2 times a day. Use in each nostril as directed  If no improvement after 1 week of appropriate therapy, we will plan to treat as bacterial and give ABX.        Reviewed and approved by NANCY BARRON on 11/20/24 at 12:39 PM.

## 2024-12-18 DIAGNOSIS — E03.9 HYPOTHYROIDISM, UNSPECIFIED TYPE: ICD-10-CM

## 2024-12-18 DIAGNOSIS — E78.5 HYPERLIPIDEMIA, UNSPECIFIED HYPERLIPIDEMIA TYPE: ICD-10-CM

## 2024-12-19 RX ORDER — LEVOTHYROXINE SODIUM 50 UG/1
50 TABLET ORAL DAILY
Qty: 90 TABLET | Refills: 3 | Status: SHIPPED | OUTPATIENT
Start: 2024-12-19

## 2025-01-01 DIAGNOSIS — N40.0 BENIGN PROSTATIC HYPERPLASIA, UNSPECIFIED WHETHER LOWER URINARY TRACT SYMPTOMS PRESENT: ICD-10-CM

## 2025-01-02 RX ORDER — TAMSULOSIN HYDROCHLORIDE 0.4 MG/1
0.8 CAPSULE ORAL DAILY
Qty: 180 CAPSULE | Refills: 1 | Status: SHIPPED | OUTPATIENT
Start: 2025-01-02

## 2025-04-01 DIAGNOSIS — E03.9 HYPOTHYROIDISM, UNSPECIFIED TYPE: ICD-10-CM

## 2025-04-01 DIAGNOSIS — E78.5 HYPERLIPIDEMIA, UNSPECIFIED HYPERLIPIDEMIA TYPE: ICD-10-CM

## 2025-04-01 RX ORDER — ATORVASTATIN CALCIUM 20 MG/1
20 TABLET, FILM COATED ORAL DAILY
Qty: 90 TABLET | Refills: 3 | Status: SHIPPED | OUTPATIENT
Start: 2025-04-01

## 2025-06-09 ENCOUNTER — APPOINTMENT (OUTPATIENT)
Dept: PRIMARY CARE | Facility: CLINIC | Age: 80
End: 2025-06-09
Payer: MEDICARE

## 2025-06-09 VITALS
SYSTOLIC BLOOD PRESSURE: 132 MMHG | WEIGHT: 161 LBS | HEIGHT: 67 IN | DIASTOLIC BLOOD PRESSURE: 68 MMHG | OXYGEN SATURATION: 99 % | TEMPERATURE: 97.8 F | HEART RATE: 68 BPM | BODY MASS INDEX: 25.27 KG/M2

## 2025-06-09 DIAGNOSIS — N62 HYPERTROPHY OF BREAST: Primary | ICD-10-CM

## 2025-06-09 PROCEDURE — 1157F ADVNC CARE PLAN IN RCRD: CPT | Performed by: STUDENT IN AN ORGANIZED HEALTH CARE EDUCATION/TRAINING PROGRAM

## 2025-06-09 PROCEDURE — 1123F ACP DISCUSS/DSCN MKR DOCD: CPT | Performed by: STUDENT IN AN ORGANIZED HEALTH CARE EDUCATION/TRAINING PROGRAM

## 2025-06-09 PROCEDURE — 1159F MED LIST DOCD IN RCRD: CPT | Performed by: STUDENT IN AN ORGANIZED HEALTH CARE EDUCATION/TRAINING PROGRAM

## 2025-06-09 PROCEDURE — 99213 OFFICE O/P EST LOW 20 MIN: CPT | Performed by: STUDENT IN AN ORGANIZED HEALTH CARE EDUCATION/TRAINING PROGRAM

## 2025-06-09 PROCEDURE — 1126F AMNT PAIN NOTED NONE PRSNT: CPT | Performed by: STUDENT IN AN ORGANIZED HEALTH CARE EDUCATION/TRAINING PROGRAM

## 2025-06-09 RX ORDER — PENICILLIN V POTASSIUM 500 MG/1
TABLET, FILM COATED ORAL
COMMUNITY
Start: 2025-04-02

## 2025-06-09 ASSESSMENT — PAIN SCALES - GENERAL: PAINLEVEL_OUTOF10: 0-NO PAIN

## 2025-06-09 ASSESSMENT — PATIENT HEALTH QUESTIONNAIRE - PHQ9
2. FEELING DOWN, DEPRESSED OR HOPELESS: NOT AT ALL
SUM OF ALL RESPONSES TO PHQ9 QUESTIONS 1 AND 2: 0
1. LITTLE INTEREST OR PLEASURE IN DOING THINGS: NOT AT ALL

## 2025-06-09 NOTE — PROGRESS NOTES
"                                                                                                                 GENERAL PROGRESS NOTE    Chief Complaint   Patient presents with    Breast Mass     In left breast x2 months       HPI  Víctor Madison is a 79 y.o. male Lt breast enlargement/mass x 2 months. Seem to be getting larger gradually. Some tenderness if applies pressure over the area.   Mother with history of cervical cancer, maternal aunt breast cancer. No breast cancer in the family.   No new medications. Does not take any supplements.     Vital signs   /68 (BP Location: Left arm, Patient Position: Sitting, BP Cuff Size: Adult)   Pulse 68   Temp 36.6 °C (97.8 °F) (Temporal)   Ht 1.702 m (5' 7\")   Wt 73 kg (161 lb)   SpO2 99%   BMI 25.22 kg/m²      Current Medications[1]    Review of Systems   All other systems have been reviewed and are negative except as noted in the HPI.       Physical Exam  Chest:   Breasts:     Breasts are asymmetrical (left breast enlargement compared to Rt).      Right: Normal. No inverted nipple, nipple discharge, skin change or tenderness.      Left: Tenderness (generalized) present. No inverted nipple, nipple discharge or skin change.           ASSESSMENT AND PLAN   1. Hypertrophy of breast (Primary)  Breast enlargement of the Lt breast compared to Rt without palpable mass. Imaging ordered to further evaluate.   - BI US breast complete left; Future  - BI mammo left diagnostic; Future      Monae Moura MD  06/09/25  4:35 PM         [1]   Current Outpatient Medications   Medication Sig Dispense Refill    atorvastatin (Lipitor) 20 mg tablet TAKE 1 TABLET DAILY 90 tablet 3    levothyroxine (Synthroid, Levoxyl) 50 mcg tablet TAKE 1 TABLET DAILY 90 tablet 3    penicillin v potassium (Veetid) 500 mg tablet TAKE 2 TABLETS BY MOUTH 1 HOUR PRIOR TO PROCEDURE AND 1 TAB 6 HOURS AFTER PROCEDURE FOR DENTAL WORK      tamsulosin (Flomax) 0.4 mg 24 hr capsule TAKE 2 CAPSULES ONCE DAILY " 180 capsule 1    azelastine (Astelin) 137 mcg (0.1 %) nasal spray Administer 1 spray into each nostril 2 times a day. Use in each nostril as directed (Patient not taking: Reported on 6/9/2025) 30 mL 12    fluticasone (Flonase) 50 mcg/actuation nasal spray Administer 1 spray into each nostril once daily. Shake gently. Before first use, prime pump. After use, clean tip and replace cap. (Patient not taking: Reported on 6/9/2025) 16 g 5     No current facility-administered medications for this visit.

## 2025-06-11 ENCOUNTER — TELEPHONE (OUTPATIENT)
Dept: PRIMARY CARE | Facility: CLINIC | Age: 80
End: 2025-06-11
Payer: MEDICARE

## 2025-06-11 DIAGNOSIS — Z00.00 ROUTINE GENERAL MEDICAL EXAMINATION AT HEALTH CARE FACILITY: ICD-10-CM

## 2025-06-11 DIAGNOSIS — E03.9 HYPOTHYROIDISM, UNSPECIFIED TYPE: ICD-10-CM

## 2025-06-11 DIAGNOSIS — E78.5 HYPERLIPIDEMIA, UNSPECIFIED HYPERLIPIDEMIA TYPE: ICD-10-CM

## 2025-06-26 ENCOUNTER — HOSPITAL ENCOUNTER (OUTPATIENT)
Dept: RADIOLOGY | Facility: HOSPITAL | Age: 80
Discharge: HOME | End: 2025-06-26
Payer: MEDICARE

## 2025-06-26 DIAGNOSIS — N62 HYPERTROPHY OF BREAST: ICD-10-CM

## 2025-06-26 PROCEDURE — 77066 DX MAMMO INCL CAD BI: CPT | Performed by: RADIOLOGY

## 2025-06-26 PROCEDURE — G0279 TOMOSYNTHESIS, MAMMO: HCPCS | Performed by: RADIOLOGY

## 2025-06-26 PROCEDURE — 77066 DX MAMMO INCL CAD BI: CPT

## 2025-06-26 PROCEDURE — 76642 ULTRASOUND BREAST LIMITED: CPT | Mod: LT

## 2025-06-26 PROCEDURE — 76642 ULTRASOUND BREAST LIMITED: CPT | Performed by: RADIOLOGY

## 2025-06-30 ENCOUNTER — TELEPHONE (OUTPATIENT)
Dept: PRIMARY CARE | Facility: CLINIC | Age: 80
End: 2025-06-30
Payer: MEDICARE

## 2025-06-30 NOTE — TELEPHONE ENCOUNTER
Spoke with patient he gave verbal understanding .   He states he is still having swelling and pain . I told him he is more than welcomed to see someone while S[facility] is out . He states he will follow-up with her on 7/11 or sooner if it worsens.

## 2025-07-01 LAB
ALBUMIN SERPL-MCNC: 4.1 G/DL (ref 3.6–5.1)
ALP SERPL-CCNC: 58 U/L (ref 35–144)
ALT SERPL-CCNC: 10 U/L (ref 9–46)
ANION GAP SERPL CALCULATED.4IONS-SCNC: 7 MMOL/L (CALC) (ref 7–17)
AST SERPL-CCNC: 12 U/L (ref 10–35)
BASOPHILS # BLD AUTO: 38 CELLS/UL (ref 0–200)
BASOPHILS NFR BLD AUTO: 0.8 %
BILIRUB SERPL-MCNC: 0.7 MG/DL (ref 0.2–1.2)
BUN SERPL-MCNC: 13 MG/DL (ref 7–25)
CALCIUM SERPL-MCNC: 9.2 MG/DL (ref 8.6–10.3)
CHLORIDE SERPL-SCNC: 102 MMOL/L (ref 98–110)
CHOLEST SERPL-MCNC: 144 MG/DL
CHOLEST/HDLC SERPL: 2.4 (CALC)
CO2 SERPL-SCNC: 33 MMOL/L (ref 20–32)
CREAT SERPL-MCNC: 1.25 MG/DL (ref 0.7–1.28)
EGFRCR SERPLBLD CKD-EPI 2021: 59 ML/MIN/1.73M2
EOSINOPHIL # BLD AUTO: 139 CELLS/UL (ref 15–500)
EOSINOPHIL NFR BLD AUTO: 2.9 %
ERYTHROCYTE [DISTWIDTH] IN BLOOD BY AUTOMATED COUNT: 12.6 % (ref 11–15)
GLUCOSE SERPL-MCNC: 98 MG/DL (ref 65–99)
HCT VFR BLD AUTO: 44.7 % (ref 38.5–50)
HDLC SERPL-MCNC: 59 MG/DL
HGB BLD-MCNC: 14.4 G/DL (ref 13.2–17.1)
LDLC SERPL CALC-MCNC: 70 MG/DL (CALC)
LYMPHOCYTES # BLD AUTO: 1027 CELLS/UL (ref 850–3900)
LYMPHOCYTES NFR BLD AUTO: 21.4 %
MCH RBC QN AUTO: 29.6 PG (ref 27–33)
MCHC RBC AUTO-ENTMCNC: 32.2 G/DL (ref 32–36)
MCV RBC AUTO: 92 FL (ref 80–100)
MONOCYTES # BLD AUTO: 504 CELLS/UL (ref 200–950)
MONOCYTES NFR BLD AUTO: 10.5 %
NEUTROPHILS # BLD AUTO: 3091 CELLS/UL (ref 1500–7800)
NEUTROPHILS NFR BLD AUTO: 64.4 %
NONHDLC SERPL-MCNC: 85 MG/DL (CALC)
PLATELET # BLD AUTO: 243 THOUSAND/UL (ref 140–400)
PMV BLD REES-ECKER: 9.3 FL (ref 7.5–12.5)
POTASSIUM SERPL-SCNC: 4.6 MMOL/L (ref 3.5–5.3)
PROT SERPL-MCNC: 6.1 G/DL (ref 6.1–8.1)
RBC # BLD AUTO: 4.86 MILLION/UL (ref 4.2–5.8)
SODIUM SERPL-SCNC: 142 MMOL/L (ref 135–146)
TRIGL SERPL-MCNC: 69 MG/DL
TSH SERPL-ACNC: 2.62 MIU/L (ref 0.4–4.5)
WBC # BLD AUTO: 4.8 THOUSAND/UL (ref 3.8–10.8)

## 2025-07-11 ENCOUNTER — APPOINTMENT (OUTPATIENT)
Dept: PRIMARY CARE | Facility: CLINIC | Age: 80
End: 2025-07-11
Payer: MEDICARE

## 2025-07-11 VITALS
BODY MASS INDEX: 24.96 KG/M2 | HEIGHT: 67 IN | WEIGHT: 159 LBS | HEART RATE: 55 BPM | RESPIRATION RATE: 16 BRPM | SYSTOLIC BLOOD PRESSURE: 112 MMHG | DIASTOLIC BLOOD PRESSURE: 70 MMHG | OXYGEN SATURATION: 100 %

## 2025-07-11 DIAGNOSIS — E78.5 HYPERLIPIDEMIA, UNSPECIFIED HYPERLIPIDEMIA TYPE: ICD-10-CM

## 2025-07-11 DIAGNOSIS — E03.9 HYPOTHYROIDISM, UNSPECIFIED TYPE: ICD-10-CM

## 2025-07-11 DIAGNOSIS — N62 GYNECOMASTIA, MALE: ICD-10-CM

## 2025-07-11 DIAGNOSIS — Z00.00 ROUTINE GENERAL MEDICAL EXAMINATION AT HEALTH CARE FACILITY: Primary | ICD-10-CM

## 2025-07-11 PROBLEM — M17.11 UNILATERAL PRIMARY OSTEOARTHRITIS, RIGHT KNEE: Status: RESOLVED | Noted: 2024-06-03 | Resolved: 2025-07-11

## 2025-07-11 LAB
POC APPEARANCE, URINE: CLEAR
POC BILIRUBIN, URINE: NEGATIVE
POC BLOOD, URINE: NEGATIVE
POC COLOR, URINE: YELLOW
POC GLUCOSE, URINE: NEGATIVE MG/DL
POC KETONES, URINE: NEGATIVE MG/DL
POC LEUKOCYTES, URINE: NEGATIVE
POC NITRITE,URINE: NEGATIVE
POC PH, URINE: 6 PH
POC PROTEIN, URINE: NEGATIVE MG/DL
POC SPECIFIC GRAVITY, URINE: 1.01
POC UROBILINOGEN, URINE: 0.2 EU/DL

## 2025-07-11 PROCEDURE — G0439 PPPS, SUBSEQ VISIT: HCPCS | Performed by: STUDENT IN AN ORGANIZED HEALTH CARE EDUCATION/TRAINING PROGRAM

## 2025-07-11 PROCEDURE — 1170F FXNL STATUS ASSESSED: CPT | Performed by: STUDENT IN AN ORGANIZED HEALTH CARE EDUCATION/TRAINING PROGRAM

## 2025-07-11 PROCEDURE — 1126F AMNT PAIN NOTED NONE PRSNT: CPT | Performed by: STUDENT IN AN ORGANIZED HEALTH CARE EDUCATION/TRAINING PROGRAM

## 2025-07-11 PROCEDURE — 1159F MED LIST DOCD IN RCRD: CPT | Performed by: STUDENT IN AN ORGANIZED HEALTH CARE EDUCATION/TRAINING PROGRAM

## 2025-07-11 PROCEDURE — 99214 OFFICE O/P EST MOD 30 MIN: CPT | Performed by: STUDENT IN AN ORGANIZED HEALTH CARE EDUCATION/TRAINING PROGRAM

## 2025-07-11 PROCEDURE — 81003 URINALYSIS AUTO W/O SCOPE: CPT | Performed by: STUDENT IN AN ORGANIZED HEALTH CARE EDUCATION/TRAINING PROGRAM

## 2025-07-11 ASSESSMENT — ACTIVITIES OF DAILY LIVING (ADL)
MANAGING_FINANCES: INDEPENDENT
GROCERY_SHOPPING: INDEPENDENT
DOING_HOUSEWORK: INDEPENDENT
DRESSING: INDEPENDENT
BATHING: INDEPENDENT
TAKING_MEDICATION: INDEPENDENT

## 2025-07-11 ASSESSMENT — ENCOUNTER SYMPTOMS
LOSS OF SENSATION IN FEET: 0
OCCASIONAL FEELINGS OF UNSTEADINESS: 0
DEPRESSION: 0

## 2025-07-11 ASSESSMENT — PAIN SCALES - GENERAL: PAINLEVEL_OUTOF10: 0-NO PAIN

## 2025-07-11 NOTE — PROGRESS NOTES
Subjective   Reason for Visit: Víctor Madison is an 79 y.o. male here for a Medicare Wellness visit.     Past Medical, Surgical, and Family History reviewed and updated in chart.    Reviewed all medications by prescribing practitioner or clinical pharmacist (such as prescriptions, OTCs, herbal therapies and supplements) and documented in the medical record.    INTERVAL HX/CURRENT CONCERNS:  Mammogram and US done for asymmetrical gynecomastia, normal.    CHRONIC CONDITIONS:  CKD3a - eGFR 59. Cr 1.25  Elevated PSA - persistent mild elevation x 7+ years  HLD- atorvastatin 20 mg. LDL 70  IFG  Hypothyroidism - TSH 2.62   BPH - tamsulosin 0.8 mg daily     Health Maintenance  Immunizations:     Tdap- discussed    Pneumococcal - done    Shingles - shingrix x 2    COVID - discussed updated vaccine    Influenza - annually    RSV - 10/2023     Colonoscopy: screening discontinued for age  Lung cancer screening: NA     Male Specific   PSA - screening discontinued for age   AAA screen: NA       Patient Health Questionnaire-2 Score: 0 (7/11/2025 10:42 AM)      Patient Care Team:  Monae Moura MD as PCP - General (Family Medicine)  Brennan Trotter DO as Surgeon (Orthopaedic Surgery)     Review of Systems   Constitutional:  Negative for chills and fever.   HENT:  Negative for trouble swallowing.    Eyes:  Negative for visual disturbance.   Respiratory:  Negative for cough, shortness of breath and wheezing.    Cardiovascular:  Negative for chest pain and palpitations.   Gastrointestinal:  Negative for abdominal pain, blood in stool, constipation and diarrhea.   Genitourinary:  Negative for difficulty urinating, penile pain and testicular pain.   Musculoskeletal:  Negative for arthralgias, back pain and joint swelling.   Skin:  Negative for rash.   Neurological:  Negative for dizziness, light-headedness and headaches.   Psychiatric/Behavioral:  Negative for dysphoric mood. The patient is not nervous/anxious.        Objective  "  Vitals:  /70 (BP Location: Left arm, Patient Position: Sitting, BP Cuff Size: Large adult)   Pulse 55   Resp 16   Ht 1.708 m (5' 7.25\")   Wt 72.1 kg (159 lb)   SpO2 100%   BMI 24.72 kg/m²       Physical Exam  Vitals and nursing note reviewed.   Constitutional:       Appearance: Normal appearance.   HENT:      Head: Normocephalic and atraumatic.      Right Ear: Tympanic membrane, ear canal and external ear normal.      Left Ear: Tympanic membrane, ear canal and external ear normal.      Mouth/Throat:      Mouth: Mucous membranes are moist.      Pharynx: Oropharynx is clear.     Eyes:      Extraocular Movements: Extraocular movements intact.      Conjunctiva/sclera: Conjunctivae normal.      Pupils: Pupils are equal, round, and reactive to light.       Cardiovascular:      Rate and Rhythm: Normal rate and regular rhythm.      Heart sounds: Normal heart sounds.   Pulmonary:      Effort: Pulmonary effort is normal.      Breath sounds: Normal breath sounds. No wheezing, rhonchi or rales.   Chest:      Chest wall: No tenderness.   Breasts:     Breasts are asymmetrical (Lt breast enlargement compared to Rt).   Abdominal:      General: Abdomen is flat.      Palpations: Abdomen is soft.      Tenderness: There is no abdominal tenderness.     Musculoskeletal:         General: Normal range of motion.      Cervical back: Normal range of motion and neck supple.     Skin:     General: Skin is warm and dry.     Neurological:      Mental Status: He is alert.     Psychiatric:         Mood and Affect: Mood normal.         Behavior: Behavior normal.         Assessment & Plan  Routine general medical examination at health care facility  Well adult exam.  1. Age appropriate preventative measures reviewed.   2. Encouraged healthy diet and exercise.  3. Immunizations- Reviewed and discussed  4. Labs- Reviewed and discussed with patient  5. Medications- Reviewed    Orders:    1 Year Follow Up In Primary Care - Wellness " Exam    Hyperlipidemia, unspecified hyperlipidemia type  Lipid panel reviewed. Continue medication. Recommend healthy diet  that includes lean proteins, vegetables, fruits, and whole grains.  Limit saturated fats, excess sodium, and processed foods.  Limit sugary drinks and sweets.  Moderate exercise at least 30 minutes per day, 5 days per week.    Orders:    POCT UA Automated manually resulted    Gynecomastia, male  Normal imaging. Check labs to complete work up.   Orders:    Testosterone, total and free; Future    Luteinizing hormone; Future    Estradiol; Future    [facility] HCG, TOTAL, QN; Future    Hypothyroidism, unspecified type  Euthyroid on current dose of levothyroxine. Continue to take on empty stomach. Recheck TSH in 1 year or sooner if develops symptoms

## 2025-07-11 NOTE — ASSESSMENT & PLAN NOTE
Lipid panel reviewed. Continue medication. Recommend healthy diet  that includes lean proteins, vegetables, fruits, and whole grains.  Limit saturated fats, excess sodium, and processed foods.  Limit sugary drinks and sweets.  Moderate exercise at least 30 minutes per day, 5 days per week.    Orders:    POCT UA Automated manually resulted

## 2025-07-22 LAB — B-HCG SERPL-ACNC: <5 MIU/ML

## 2025-07-25 LAB
ESTRADIOL SERPL-MCNC: 33 PG/ML
LH SERPL-ACNC: 3.4 MIU/ML (ref 1.6–15.2)
TESTOST FREE SERPL-MCNC: 46 PG/ML (ref 30–135)
TESTOST SERPL-MCNC: 291 NG/DL (ref 250–1100)

## 2025-07-28 ASSESSMENT — ENCOUNTER SYMPTOMS
DIFFICULTY URINATING: 0
CHILLS: 0
ABDOMINAL PAIN: 0
HEADACHES: 0
DYSPHORIC MOOD: 0
NERVOUS/ANXIOUS: 0
LIGHT-HEADEDNESS: 0
ARTHRALGIAS: 0
PALPITATIONS: 0
DIZZINESS: 0
FEVER: 0
BLOOD IN STOOL: 0
BACK PAIN: 0
JOINT SWELLING: 0
SHORTNESS OF BREATH: 0
WHEEZING: 0
DIARRHEA: 0
CONSTIPATION: 0
COUGH: 0
TROUBLE SWALLOWING: 0

## 2025-07-28 NOTE — ASSESSMENT & PLAN NOTE
Euthyroid on current dose of levothyroxine. Continue to take on empty stomach. Recheck TSH in 1 year or sooner if develops symptoms

## (undated) DEVICE — CATHETER TRAY, SURESTEP, 14FR, PRECONNECTED DRAIN BAG

## (undated) DEVICE — SUTURE, CTD, VICRYL, 2-0, UND, BR, CT-2

## (undated) DEVICE — HEMOSTAT, ARISTA, ABSORBABLE, 3 GRAMS

## (undated) DEVICE — PIN, BONE, 4MM X 140MM, STERILE

## (undated) DEVICE — SYRINGE, 60 CC, LUER LOCK, MONOJECT, W/O CAP, LF

## (undated) DEVICE — SUTURE, VICRYL, 1, 36 IN, CT-1, UNDYED

## (undated) DEVICE — TRACKING KIT, TM KNEE PROCEDURES, VIZADISC

## (undated) DEVICE — IRRIGATION SYSTEM, WOUND, PULSAVAC PLUS

## (undated) DEVICE — BLADE, OSCILLATOR 19.5 X 86 X 1.27MM

## (undated) DEVICE — SUTURE, V-LOC, 3-0, 23IN, P-12, 90 ABS

## (undated) DEVICE — DRESSING, GAUZE, SPONGE, KERLIX, SUPER, 6 X 6.75 IN, STERILE 10PK

## (undated) DEVICE — IRRIGATION SYSTEM, WOUND, SURGIPHOR, 450ML, STERILE

## (undated) DEVICE — COVER, TABLE, 44 X 75 IN, DISPOSABLE, LF, STERILE

## (undated) DEVICE — GLOVE, SURGICAL, PROTEXIS NEOPRENE, 8.0, PF, LF

## (undated) DEVICE — TIP, SUCTION, YANKAUER, FLEXIBLE

## (undated) DEVICE — SOLUTION, IRRIGATION, SODIUM CHLORIDE 0.9%, 1000 ML, POUR BOTTLE

## (undated) DEVICE — GLOVE, SURGICAL, PROTEXIS NEOPRENE, 8.5, PF, LF

## (undated) DEVICE — CHECKPOINT KIT, FEMORAL/ TIBIAL

## (undated) DEVICE — DRESSING, MEPILEX BORDER, POST-OP AG, 4 X 10 IN

## (undated) DEVICE — DRAPE KIT, RIO

## (undated) DEVICE — BLADE, MAKO, SAGITTAL, NARROW

## (undated) DEVICE — DRAPE PACK, TOTAL KNEE, CUSTOM, GEAUGA

## (undated) DEVICE — ADHESIVE, SKIN, LIQUIBAND EXCEED

## (undated) DEVICE — KIT, MINOR, DOUBLE BASIN

## (undated) DEVICE — NEEDLE, SPINAL, 20 G X 3.5 IN, YELLOW HUB

## (undated) DEVICE — PIN, BONE, 4MM X 110MM, STERILE